# Patient Record
Sex: MALE | Race: WHITE | NOT HISPANIC OR LATINO | Employment: FULL TIME | URBAN - METROPOLITAN AREA
[De-identification: names, ages, dates, MRNs, and addresses within clinical notes are randomized per-mention and may not be internally consistent; named-entity substitution may affect disease eponyms.]

---

## 2017-01-05 ENCOUNTER — ALLSCRIPTS OFFICE VISIT (OUTPATIENT)
Dept: OTHER | Facility: OTHER | Age: 57
End: 2017-01-05

## 2017-01-24 ENCOUNTER — ALLSCRIPTS OFFICE VISIT (OUTPATIENT)
Dept: OTHER | Facility: OTHER | Age: 57
End: 2017-01-24

## 2017-01-24 DIAGNOSIS — B18.2 CHRONIC VIRAL HEPATITIS C (HCC): ICD-10-CM

## 2017-01-27 ENCOUNTER — TRANSCRIBE ORDERS (OUTPATIENT)
Dept: LAB | Facility: CLINIC | Age: 57
End: 2017-01-27

## 2017-01-27 ENCOUNTER — APPOINTMENT (OUTPATIENT)
Dept: LAB | Facility: CLINIC | Age: 57
End: 2017-01-27
Payer: COMMERCIAL

## 2017-01-27 DIAGNOSIS — B19.21 HEPATITIS C VIRUS INFECTION WITH HEPATIC COMA, UNSPECIFIED CHRONICITY: ICD-10-CM

## 2017-01-27 DIAGNOSIS — B19.21 HEPATITIS C VIRUS INFECTION WITH HEPATIC COMA, UNSPECIFIED CHRONICITY: Primary | ICD-10-CM

## 2017-01-27 DIAGNOSIS — B18.2 CHRONIC VIRAL HEPATITIS C (HCC): ICD-10-CM

## 2017-01-27 LAB
ALBUMIN SERPL BCP-MCNC: 3.8 G/DL (ref 3.5–5)
ALP SERPL-CCNC: 94 U/L (ref 46–116)
ALT SERPL W P-5'-P-CCNC: 53 U/L (ref 12–78)
ANION GAP SERPL CALCULATED.3IONS-SCNC: 9 MMOL/L (ref 4–13)
AST SERPL W P-5'-P-CCNC: 39 U/L (ref 5–45)
BILIRUB DIRECT SERPL-MCNC: 0.08 MG/DL (ref 0–0.2)
BILIRUB SERPL-MCNC: 0.25 MG/DL (ref 0.2–1)
BUN SERPL-MCNC: 14 MG/DL (ref 5–25)
CALCIUM SERPL-MCNC: 8.9 MG/DL (ref 8.3–10.1)
CHLORIDE SERPL-SCNC: 105 MMOL/L (ref 100–108)
CO2 SERPL-SCNC: 26 MMOL/L (ref 21–32)
CREAT SERPL-MCNC: 0.78 MG/DL (ref 0.6–1.3)
GFR SERPL CREATININE-BSD FRML MDRD: >60 ML/MIN/1.73SQ M
GLUCOSE SERPL-MCNC: 93 MG/DL (ref 65–140)
POTASSIUM SERPL-SCNC: 4.3 MMOL/L (ref 3.5–5.3)
PROT SERPL-MCNC: 8.2 G/DL (ref 6.4–8.2)
SODIUM SERPL-SCNC: 140 MMOL/L (ref 136–145)

## 2017-01-27 PROCEDURE — 36415 COLL VENOUS BLD VENIPUNCTURE: CPT

## 2017-01-27 PROCEDURE — 83010 ASSAY OF HAPTOGLOBIN QUANT: CPT

## 2017-01-27 PROCEDURE — 83883 ASSAY NEPHELOMETRY NOT SPEC: CPT

## 2017-01-27 PROCEDURE — 82977 ASSAY OF GGT: CPT

## 2017-01-27 PROCEDURE — 82247 BILIRUBIN TOTAL: CPT

## 2017-01-27 PROCEDURE — 87522 HEPATITIS C REVRS TRNSCRPJ: CPT

## 2017-01-27 PROCEDURE — 82248 BILIRUBIN DIRECT: CPT

## 2017-01-27 PROCEDURE — 80053 COMPREHEN METABOLIC PANEL: CPT

## 2017-01-27 PROCEDURE — 82172 ASSAY OF APOLIPOPROTEIN: CPT

## 2017-01-27 PROCEDURE — 87902 NFCT AGT GNTYP ALYS HEP C: CPT

## 2017-01-27 PROCEDURE — 84460 ALANINE AMINO (ALT) (SGPT): CPT

## 2017-01-29 LAB
HCV GENTYP SERPL NAA+PROBE: NORMAL
HCV PLEASE NOTE: NORMAL

## 2017-01-31 LAB
A2 MACROGLOB SERPL-MCNC: 350 MG/DL (ref 110–276)
ALT SERPL W P-5'-P-CCNC: 49 IU/L (ref 0–55)
APO A-I SERPL-MCNC: 176 MG/DL (ref 101–178)
BILIRUB SERPL-MCNC: 0.1 MG/DL (ref 0–1.2)
COMMENT: ABNORMAL
FIBROSIS SCORING:: ABNORMAL
FIBROSIS STAGE SERPL QL: ABNORMAL
GGT SERPL-CCNC: 276 IU/L (ref 0–65)
HAPTOGLOB SERPL-MCNC: 103 MG/DL (ref 34–200)
HCV RNA SERPL NAA+PROBE-ACNC: NORMAL IU/ML
HCV RNA SERPL NAA+PROBE-LOG IU: 6.49 LOG10 IU/ML
INTERPRETATIONS: ABNORMAL
LIVER FIBR SCORE SERPL CALC.FIBROSURE: 0.39 (ref 0–0.21)
NECROINFLAMM ACTIVITY SCORING:: ABNORMAL
NECROINFLAMMATORY ACT GRADE SERPL QL: ABNORMAL
NECROINFLAMMATORY ACT SCORE SERPL: 0.31 (ref 0–0.17)
SERVICE CMNT-IMP: ABNORMAL
TEST INFORMATION: NORMAL

## 2017-02-03 ENCOUNTER — APPOINTMENT (OUTPATIENT)
Dept: LAB | Facility: CLINIC | Age: 57
End: 2017-02-03
Payer: COMMERCIAL

## 2017-02-03 DIAGNOSIS — B18.2 CHRONIC VIRAL HEPATITIS C (HCC): ICD-10-CM

## 2017-02-03 PROCEDURE — 36415 COLL VENOUS BLD VENIPUNCTURE: CPT

## 2017-02-27 ENCOUNTER — GENERIC CONVERSION - ENCOUNTER (OUTPATIENT)
Dept: OTHER | Facility: OTHER | Age: 57
End: 2017-02-27

## 2017-11-08 ENCOUNTER — APPOINTMENT (OUTPATIENT)
Dept: LAB | Facility: CLINIC | Age: 57
End: 2017-11-08
Payer: COMMERCIAL

## 2017-11-08 ENCOUNTER — TRANSCRIBE ORDERS (OUTPATIENT)
Dept: MAMMOGRAPHY | Facility: CLINIC | Age: 57
End: 2017-11-08

## 2017-11-08 DIAGNOSIS — I10 ESSENTIAL HYPERTENSION, MALIGNANT: Primary | ICD-10-CM

## 2017-11-08 LAB
ANION GAP SERPL CALCULATED.3IONS-SCNC: 9 MMOL/L (ref 4–13)
BUN SERPL-MCNC: 21 MG/DL (ref 5–25)
CALCIUM SERPL-MCNC: 8.9 MG/DL (ref 8.3–10.1)
CHLORIDE SERPL-SCNC: 105 MMOL/L (ref 100–108)
CO2 SERPL-SCNC: 24 MMOL/L (ref 21–32)
CREAT SERPL-MCNC: 0.83 MG/DL (ref 0.6–1.3)
GFR SERPL CREATININE-BSD FRML MDRD: 98 ML/MIN/1.73SQ M
GLUCOSE SERPL-MCNC: 98 MG/DL (ref 65–140)
POTASSIUM SERPL-SCNC: 4.5 MMOL/L (ref 3.5–5.3)
SODIUM SERPL-SCNC: 138 MMOL/L (ref 136–145)

## 2017-11-08 PROCEDURE — 80048 BASIC METABOLIC PNL TOTAL CA: CPT

## 2017-11-08 PROCEDURE — 36415 COLL VENOUS BLD VENIPUNCTURE: CPT

## 2018-01-09 NOTE — RESULT NOTES
Verified Results  (1) COMPREHENSIVE METABOLIC PANEL [id number] 18:27DP [facility]     Test Name Result Flag Reference   GLUCOSE 101 mg/dL H 65-99   Fasting reference interval   UREA NITROGEN (BUN) 18 mg/dL  7-25   CREATININE 0 96 mg/dL  0 70-1 33   For patients >52years of age, the reference limit  for Creatinine is approximately 13% higher for people  identified as -American  eGFR NON-AFR  AMERICAN 88 mL/min/1 73m2  > OR = 60   eGFR AFRICAN AMERICAN 102 mL/min/1 73m2  > OR = 60   BUN/CREATININE RATIO   9-98   NOT APPLICABLE (calc)   SODIUM 139 mmol/L  135-146   POTASSIUM 4 1 mmol/L  3 5-5 3   CHLORIDE 104 mmol/L     CARBON DIOXIDE 26 mmol/L  20-31   CALCIUM 9 1 mg/dL  8 6-10 3   PROTEIN, TOTAL 7 6 g/dL  6 1-8 1   ALBUMIN 4 2 g/dL  3 6-5 1   GLOBULIN 3 4 g/dL (calc)  1 9-3 7   ALBUMIN/GLOBULIN RATIO 1 2 (calc)  1 0-2 5   BILIRUBIN, TOTAL 0 3 mg/dL  0 2-1 2   ALKALINE PHOSPHATASE 88 U/L     AST 48 U/L H 10-35   ALT 47 U/L H 9-46     (1) CBC/PLT/DIFF [id number] 01:33PM [facility]     Test Name Result Flag Reference   WHITE BLOOD CELL COUNT 6 8 Thousand/uL  3 8-10 8   RED BLOOD CELL COUNT 4 76 Million/uL  4 20-5 80   HEMOGLOBIN 15 9 g/dL  13 2-17 1   HEMATOCRIT 48 1 %  38 5-50 0    1 fL H 80 0-100 0   MCH 33 4 pg H 27 0-33 0   MCHC 33 0 g/dL  32 0-36 0   RDW 12 0 %  11 0-15 0   PLATELET COUNT 250 Thousand/uL  140-400   MPV 8 3 fL  7 5-11 5   ABSOLUTE NEUTROPHILS 4141 cells/uL  7662-1079   ABSOLUTE LYMPHOCYTES 1816 cells/uL  850-3900   ABSOLUTE MONOCYTES 768 cells/uL  200-950   ABSOLUTE EOSINOPHILS 54 cells/uL     ABSOLUTE BASOPHILS 20 cells/uL  0-200   NEUTROPHILS 60 9 %     LYMPHOCYTES 26 7 %     MONOCYTES 11 3 %     EOSINOPHILS 0 8 %     BASOPHILS 0 3 %       (1) PSA (SCREEN) (Dx V76 44 Screen for Prostate Cancer) [id number] 01:33PM [facility]   REPORT COMMENT:  FASTING:NO  PATIENT NOT FASTING; ADVISED TO RETURN FOR COLLECTION       Test Name Result Flag Reference PSA, TOTAL 0 5 ng/mL  < OR = 4 0   This test was performed using the Siemens  chemiluminescent method  Values obtained from  different assay methods cannot be used  interchangeably  PSA levels, regardless of  value, should not be interpreted as absolute  evidence of the presence or absence of disease         Discussion/Summary   labs are showing slight elevated liver enzymes watch alcohol consumption

## 2018-01-14 VITALS
HEIGHT: 66 IN | WEIGHT: 162.8 LBS | SYSTOLIC BLOOD PRESSURE: 130 MMHG | DIASTOLIC BLOOD PRESSURE: 80 MMHG | HEART RATE: 76 BPM | BODY MASS INDEX: 26.16 KG/M2

## 2018-01-14 VITALS
HEIGHT: 66 IN | WEIGHT: 164.2 LBS | BODY MASS INDEX: 26.39 KG/M2 | TEMPERATURE: 99 F | OXYGEN SATURATION: 96 % | SYSTOLIC BLOOD PRESSURE: 148 MMHG | DIASTOLIC BLOOD PRESSURE: 88 MMHG | HEART RATE: 86 BPM

## 2018-01-17 NOTE — PROGRESS NOTES
Assessment    1  Acute sinusitis (461 9) (J01 90)    Plan  Acute sinusitis    · Azithromycin 500 MG Oral Tablet; TAKE 1 TABLET DAILY UNTIL FINISHED   · Benzonatate 100 MG Oral Capsule (Tessalon Perles); TAKE 1 CAPSULE EVERY 8  HOURS AS NEEDED  Health Maintenance    · COLONOSCOPY; Status:Temporary Deferral - Pt requests deferral;    · Stop: Fluzone Quadrivalent 0 5 ML Intramuscular Suspension    Discussion/Summary    Acute Sinusitis- trial of azithromycin and tessalon perles  Pt advised to call in 1week with an update  Possible side effects of new medications were reviewed with the patient/guardian today  The treatment plan was reviewed with the patient/guardian  The patient/guardian understands and agrees with the treatment plan      Chief Complaint  Patient is here for a cough  History of Present Illness  Pt is here for fever and cough for the past 5 days  Cough is productive of a yellow phleghm  Pt is not a smoker  No allergies, did not receive flu vaccine  Pt took some dayquil today at 6:30am  Pt also has drainage from nose as well  Review of Systems    Constitutional: No fever or chills, feels well, no tiredness, no recent weight gain or weight loss  ENT: as noted in HPI  Cardiovascular: No complaints of slow heart rate, no fast heart rate, no chest pain, no palpitations, no leg claudication, no lower extremity  Respiratory: cough, but as noted in HPI  Gastrointestinal: No complaints of abdominal pain, no constipation, no nausea or vomiting, no diarrhea or bloody stools  Musculoskeletal: No complaints of arthralgia, no myalgias, no joint swelling or stiffness, no limb pain or swelling  Integumentary: No complaints of skin rash or skin lesions, no itching, no skin wound, no dry skin  ROS reviewed  Active Problems    1  Alcohol use (V49 89) (F10 99)   2  Benign essential hypertension (401 1) (I10)   3  Colon cancer screening (V76 51) (Z12 11)   4   Erectile dysfunction of non-organic origin (302 72) (F52 21)   5  Vasovagal syncope (780 2) (R55)    Past Medical History    1  History of Chronic hepatitis C virus infection (070 54) (B18 2)   2  History of Coronary Ostial Stenosis (414 01)   3  History of acute bronchitis (V12 69) (Z87 09)   4  History of hypertension (V12 59) (Z86 79)    The active problems and past medical history were reviewed and updated today  Surgical History    1  History of Chest Tube Insertion   2  History of Hand Surgery    Family History    1  Family history of arthritis (V17 7) (Z82 61)   2  Family history of multiple sclerosis (V17 2) (Z82 0)    3  Family history of diabetes mellitus (V18 0) (Z83 3)    Social History    · Alcohol use (V49 89) (F10 99)   · Never a smoker    Current Meds   1  Cialis 5 MG Oral Tablet; Take 1 tablet daily; Therapy: [id number] to (Evaluate:[id number])  Requested for: 26Jan2016; Last   Rx:26Jan2016 Ordered   2  Lisinopril-Hydrochlorothiazide 10-12 5 MG Oral Tablet; take 1 tablet by mouth every day; Therapy: 38NNR8594 to (Evaluate:[id number])  Requested for: 39PIL7129; Last   Rx:20Oct2015 Ordered    Allergies    1  Claritin-D 24 Hour TB24    Vitals  Vital Signs [Data Includes: Current Encounter]    Recorded: 30WDA5135 10:33AM   Temperature 99 4 F   Heart Rate 82   Systolic 252   Diastolic 84   Height 5 ft 6 in   Weight 159 lb 4 00 oz   BMI Calculated 25 7   BSA Calculated 1 82   O2 Saturation 97     Physical Exam    Constitutional   General appearance: No acute distress, well appearing and well nourished  Eyes   Conjunctiva and lids: No swelling, erythema, or discharge  Ears, Nose, Mouth, and Throat   Nasal mucosa, septum, and turbinates: Normal without edema or erythema  Oropharynx: Normal with no erythema, edema, exudate or lesions  Pulmonary   Respiratory effort: No increased work of breathing or signs of respiratory distress      Auscultation of lungs: Clear to auscultation, equal breath sounds bilaterally, no wheezes, no rales, no rhonci  Cardiovascular   Auscultation of heart: Normal rate and rhythm, normal S1 and S2, without murmurs  Musculoskeletal   Gait and station: Normal     Skin   Skin and subcutaneous tissue: Normal without rashes or lesions           Signatures   Electronically signed by : Devi Johnson MD; Feb 1 2016 11:06AM EST                       (Author)

## 2018-01-30 ENCOUNTER — OFFICE VISIT (OUTPATIENT)
Dept: FAMILY MEDICINE CLINIC | Facility: CLINIC | Age: 58
End: 2018-01-30
Payer: COMMERCIAL

## 2018-01-30 VITALS
OXYGEN SATURATION: 98 % | TEMPERATURE: 98.5 F | SYSTOLIC BLOOD PRESSURE: 152 MMHG | WEIGHT: 167 LBS | BODY MASS INDEX: 26.95 KG/M2 | HEART RATE: 83 BPM | DIASTOLIC BLOOD PRESSURE: 98 MMHG | RESPIRATION RATE: 18 BRPM

## 2018-01-30 DIAGNOSIS — F52.21 ERECTILE DYSFUNCTION OF NON-ORGANIC ORIGIN: ICD-10-CM

## 2018-01-30 DIAGNOSIS — I10 BENIGN ESSENTIAL HYPERTENSION: Primary | ICD-10-CM

## 2018-01-30 PROCEDURE — 99214 OFFICE O/P EST MOD 30 MIN: CPT | Performed by: NURSE PRACTITIONER

## 2018-01-30 RX ORDER — TADALAFIL 5 MG
5 TABLET ORAL DAILY
Refills: 3 | COMMUNITY
Start: 2018-01-24 | End: 2018-08-01

## 2018-01-30 RX ORDER — METOPROLOL SUCCINATE 25 MG/1
25 TABLET, EXTENDED RELEASE ORAL DAILY
Qty: 30 TABLET | Refills: 5 | Status: SHIPPED | OUTPATIENT
Start: 2018-01-30 | End: 2018-08-01 | Stop reason: SDUPTHER

## 2018-01-30 RX ORDER — LISINOPRIL 20 MG/1
20 TABLET ORAL DAILY
Refills: 5 | COMMUNITY
Start: 2018-01-14 | End: 2018-05-03

## 2018-01-30 RX ORDER — SILDENAFIL CITRATE 20 MG/1
20 TABLET ORAL DAILY
Qty: 10 TABLET | Refills: 0 | Status: SHIPPED | OUTPATIENT
Start: 2018-01-30 | End: 2018-08-01

## 2018-01-31 NOTE — PROGRESS NOTES
Assessment/Plan:    No problem-specific Assessment & Plan notes found for this encounter  Diagnoses and all orders for this visit:    Benign essential hypertension    Erectile dysfunction of non-organic origin    Other orders  -     lisinopril (ZESTRIL) 20 mg tablet; Take 20 mg by mouth daily  -     CIALIS 5 MG tablet; Take 5 mg by mouth daily          Subjective:      Patient ID: Winnie Dc is a 62 y o  male  Pt has been on BP medication for a few years  Last Fall he was seen by his Cardiologist    Pt medication was increased at that time  He has had elevated readings since that time  He got a BP cuff and he rpeorted his numbers to the Cardiologist, but never received a call back   A few weeks ago, he checked his BP at the grocery store and it was 163/103  He does not exercise but has a physical job  He does limit salt in his diet  He eats lots of fruits and veggies daily  Denies chest pain, SOB, or headache  ED- insurance coverage issues  Needs to change to [employer]        The following portions of the patient's history were reviewed and updated as appropriate:   He  has a past medical history of Coronary ostial stenosis and Hypertension  He  does not have any pertinent problems on file  He  has a past surgical history that includes Chest tube insertion and Hand surgery  His family history includes Arthritis in his mother; Diabetes in his father; Multiple sclerosis in his mother  He  reports that he has never smoked  He does not have any smokeless tobacco history on file  He reports that he drinks alcohol  His drug history is not on file    Current Outpatient Prescriptions   Medication Sig Dispense Refill    CIALIS 5 MG tablet Take 5 mg by mouth daily  3    lisinopril (ZESTRIL) 20 mg tablet Take 20 mg by mouth daily  5    metoprolol succinate (TOPROL-XL) 25 mg 24 hr tablet Take 1 tablet (25 mg total) by mouth daily for 30 days 30 tablet 5    sildenafil (REVATIO) 20 mg tablet Take 1 tablet (20 mg total) by mouth daily for 30 days 10 tablet 0     No current facility-administered medications for this visit  No current outpatient prescriptions on file prior to visit  No current facility-administered medications on file prior to visit  He is allergic to claritin-d 24 hour  [loratadine-pseudoephedrine er]       Review of Systems   Constitutional: Negative  HENT: Negative  Respiratory: Negative  Cardiovascular: Negative  Gastrointestinal: Negative  Genitourinary:        + ED   Musculoskeletal: Negative  Skin: Negative  Neurological: Negative  Hematological: Negative  Psychiatric/Behavioral: Negative  Objective:     Physical Exam   Constitutional: He is oriented to person, place, and time  He appears well-developed and well-nourished  HENT:   Head: Normocephalic and atraumatic  Mouth/Throat: Oropharynx is clear and moist    Eyes: Conjunctivae and EOM are normal  Pupils are equal, round, and reactive to light  Neck: Normal range of motion  Cardiovascular: Normal rate, regular rhythm and normal heart sounds  Exam reveals no gallop and no friction rub  No murmur heard  Pulmonary/Chest: Effort normal and breath sounds normal  No respiratory distress  Abdominal: Soft  There is no tenderness  Musculoskeletal: Normal range of motion  Lymphadenopathy:     He has no cervical adenopathy  Neurological: He is alert and oriented to person, place, and time  Skin: Skin is warm and dry  Psychiatric: He has a normal mood and affect  His behavior is normal  Judgment and thought content normal    Nursing note and vitals reviewed

## 2018-01-31 NOTE — PATIENT INSTRUCTIONS
BP goal is less than 140/90, optimum is around 130/80    Chronic Hypertension   WHAT YOU NEED TO KNOW:   Hypertension is high blood pressure (BP)  Your BP is the force of your blood moving against the walls of your arteries  Normal BP is less than 120/80  Prehypertension is between 120/80 and 139/89  Hypertension is 140/90 or higher  Hypertension causes your BP to get so high that your heart has to work much harder than normal  This can damage your heart  Chronic hypertension is a long-term condition that you can control with a healthy lifestyle or medicines  A controlled blood pressure helps protect your organs, such as your heart, lungs, brain, and kidneys  DISCHARGE INSTRUCTIONS:   Call 911 for any of the following:   · You have discomfort in your chest that feels like squeezing, pressure, fullness, or pain  · You become confused or have difficulty speaking  · You suddenly feel lightheaded or have trouble breathing  · You have pain or discomfort in your back, neck, jaw, stomach, or arm  Return to the emergency department if:   · You have a severe headache or vision loss  · You have weakness in an arm or leg  Contact your healthcare provider if:   · You feel faint, dizzy, confused, or drowsy  · You have been taking your BP medicine and your BP is still higher than your healthcare provider says it should be  · You have questions or concerns about your condition or care  Medicines: You may need any of the following:  · Medicine  may be used to help lower your BP  You may need more than one type of medicine  Take the medicine exactly as directed  · Diuretics  help decrease extra fluid that collects in your body  This will help lower your BP  You may urinate more often while you take this medicine  · Cholesterol medicine  helps lower your cholesterol level  A low cholesterol level helps prevent heart disease and makes it easier to control your blood pressure       · Take your medicine as directed  Contact your healthcare provider if you think your medicine is not helping or if you have side effects  Tell him or her if you are allergic to any medicine  Keep a list of the medicines, vitamins, and herbs you take  Include the amounts, and when and why you take them  Bring the list or the pill bottles to follow-up visits  Carry your medicine list with you in case of an emergency  Follow up with your healthcare provider as directed: You will need to return to have your blood pressure checked and to have other lab tests done  Write down your questions so you remember to ask them during your visits  Manage chronic hypertension:  Talk with your healthcare provider about these and other ways to manage hypertension:  · Take your BP at home  Sit and rest for 5 minutes before you take your BP  Extend your arm and support it on a flat surface  Your arm should be at the same level as your heart  Follow the directions that came with your BP monitor  If possible, take at least 2 BP readings each time  Take your BP at least twice a day at the same times each day, such as morning and evening  Keep a record of your BP readings and bring it to your follow-up visits  Ask your healthcare provider what your blood pressure should be  · Limit sodium (salt) as directed  Too much sodium can affect your fluid balance  Check labels to find low-sodium or no-salt-added foods  Some low-sodium foods use potassium salts for flavor  Too much potassium can also cause health problems  Your healthcare provider will tell you how much sodium and potassium are safe for you to have in a day  He or she may recommend that you limit sodium to 2,300 mg a day  · Follow the meal plan recommended by your healthcare provider  A dietitian or your provider can give you more information on low-sodium plans or the DASH (Dietary Approaches to Stop Hypertension) eating plan   The DASH plan is low in sodium, unhealthy fats, and total fat  It is high in potassium, calcium, and fiber  · Exercise to maintain a healthy weight  Exercise at least 30 minutes per day, on most days of the week  This will help decrease your blood pressure  Ask about the best exercise plan for you  · Decrease stress  This may help lower your BP  Learn ways to relax, such as deep breathing or listening to music  · Limit alcohol  Women should limit alcohol to 1 drink a day  Men should limit alcohol to 2 drinks a day  A drink of alcohol is 12 ounces of beer, 5 ounces of wine, or 1½ ounces of liquor  · Do not smoke  Nicotine and other chemicals in cigarettes and cigars can increase your BP and also cause lung damage  Ask your healthcare provider for information if you currently smoke and need help to quit  E-cigarettes or smokeless tobacco still contain nicotine  Talk to your healthcare provider before you use these products  © 2017 [employer]  Information is for End User's use only and may not be sold, redistributed or otherwise used for commercial purposes  All illustrations and images included in CareNotes® are the copyrighted property of [employer]  or [employer]  The above information is an  only  It is not intended as medical advice for individual conditions or treatments  Talk to your doctor, nurse or pharmacist before following any medical regimen to see if it is safe and effective for you  Metoprolol (By mouth)   Metoprolol (met-oh-PROE-lol)  Treats high blood pressure, angina (chest pain), and heart failure  May lower the risk of death after a heart attack  This medicine is a beta-blocker  Brand Name(s): Lopressor, Toprol XL   There may be other brand names for this medicine  When This Medicine Should Not Be Used: This medicine is not right for everyone  Do not use if you had an allergic reaction to metoprolol or similar medicines   Do not use this medicine if you have certain blood circulation or heart problems  Ask your doctor about these problems  How to Use This Medicine:   Tablet, Long Acting Tablet  · Take your medicine as directed  Your dose may need to be changed several times to find what works best for you  · Take this medicine with a meal or right after a meal  Take this medicine the same way every day, at the same time  · Swallow the tablet whole with a glass of water  You may break the extended-release tablet in half, but do not chew or crush it  · Missed dose: Take a dose as soon as you remember  If it is almost time for your next dose, wait until then and take a regular dose  Do not take extra medicine to make up for a missed dose  · Store the medicine in a closed container at room temperature, away from heat, moisture, and direct light  Drugs and Foods to Avoid:   Ask your doctor or pharmacist before using any other medicine, including over-the-counter medicines, vitamins, and herbal products  · Some medicines can affect how metoprolol works   Tell your doctor if you are taking any of the following:   ¨ Digoxin, dipyridamole, hydralazine, hydroxychloroquine, methyldopa, quinidine  ¨ Medicine to treat depression (such as bupropion, clomipramine, desipramine, fluoxetine, fluvoxamine, paroxetine, sertraline), medicine to treat mental illness (such as chlorpromazine, fluphenazine, haloperidol, thioridazine), medicine for heart rhythm problems (such as propafenone), HIV/AIDS medicine (such as ritonavir), medicine to treat a fungus infection (such as terbinafine), a monoamine oxidase inhibitor (MAOI), an ergot medicine for headaches, a calcium channel blocker (such as amlodipine, diltiazem, verapamil), or an alpha blocker (such as clonidine, prazosin, reserpine, guanethidine)  Warnings While Using This Medicine:   · Tell your doctor if you are pregnant or breastfeeding, or if you have blood vessel, heart, or circulation problems (such as heart failure, rhythm problems, or slow heartbeat)  Tell your doctor if you have kidney disease, liver disease, diabetes, lung disease (such as asthma), an overactive thyroid, or a history of allergies  · This medicine may cause worse symptoms of heart failure while the dose is being adjusted  · Do not stop using this medicine suddenly  Your doctor will need to slowly decrease your dose before you stop it completely  · Tell any doctor or dentist who treats you that you are using this medicine  You may need to stop using this medicine several days before you have surgery or medical tests  · This medicine could lower your blood pressure too much, especially when you first use it or if you are dehydrated  Stand or sit up slowly if you feel lightheaded or dizzy  · This medicine may make you dizzy or drowsy  Do not drive or do anything else that could be dangerous until you know how this medicine affects you  · Your doctor will check your progress and the effects of this medicine at regular visits  Keep all appointments  · Keep all medicine out of the reach of children  Never share your medicine with anyone  Possible Side Effects While Using This Medicine:   Call your doctor right away if you notice any of these side effects:  · Allergic reaction: Itching or hives, swelling in your face or hands, swelling or tingling in your mouth or throat, chest tightness, trouble breathing  · Lightheadedness, dizziness, or fainting  · Slow heartbeat  · Swelling in your hands, ankles, or feet, trouble breathing, tiredness  · Worsening chest pain  If you notice these less serious side effects, talk with your doctor:   · Diarrhea  · Mild dizziness or tiredness  If you notice other side effects that you think are caused by this medicine, tell your doctor  Call your doctor for medical advice about side effects   You may report side effects to FDA at 5-408-OED-4442  © 2017 [facility] Information is for End User's use only and may not be sold, redistributed or otherwise used for commercial purposes  The above information is an  only  It is not intended as medical advice for individual conditions or treatments  Talk to your doctor, nurse or pharmacist before following any medical regimen to see if it is safe and effective for you

## 2018-04-30 ENCOUNTER — TRANSCRIBE ORDERS (OUTPATIENT)
Dept: LAB | Facility: CLINIC | Age: 58
End: 2018-04-30

## 2018-04-30 ENCOUNTER — APPOINTMENT (OUTPATIENT)
Dept: LAB | Facility: CLINIC | Age: 58
End: 2018-04-30
Payer: COMMERCIAL

## 2018-04-30 DIAGNOSIS — I10 ESSENTIAL HYPERTENSION, MALIGNANT: ICD-10-CM

## 2018-04-30 DIAGNOSIS — I10 ESSENTIAL HYPERTENSION, MALIGNANT: Primary | ICD-10-CM

## 2018-04-30 LAB
ALBUMIN SERPL BCP-MCNC: 4.1 G/DL (ref 3.5–5)
ALP SERPL-CCNC: 99 U/L (ref 46–116)
ALT SERPL W P-5'-P-CCNC: 43 U/L (ref 12–78)
ANION GAP SERPL CALCULATED.3IONS-SCNC: 7 MMOL/L (ref 4–13)
AST SERPL W P-5'-P-CCNC: 44 U/L (ref 5–45)
BILIRUB SERPL-MCNC: 0.5 MG/DL (ref 0.2–1)
BUN SERPL-MCNC: 17 MG/DL (ref 5–25)
CALCIUM SERPL-MCNC: 9.4 MG/DL (ref 8.3–10.1)
CHLORIDE SERPL-SCNC: 99 MMOL/L (ref 100–108)
CHOLEST SERPL-MCNC: 217 MG/DL (ref 50–200)
CO2 SERPL-SCNC: 27 MMOL/L (ref 21–32)
CREAT SERPL-MCNC: 1.01 MG/DL (ref 0.6–1.3)
GFR SERPL CREATININE-BSD FRML MDRD: 82 ML/MIN/1.73SQ M
GLUCOSE P FAST SERPL-MCNC: 98 MG/DL (ref 65–99)
HDLC SERPL-MCNC: 64 MG/DL (ref 40–60)
LDLC SERPL CALC-MCNC: 128 MG/DL (ref 0–100)
NONHDLC SERPL-MCNC: 153 MG/DL
POTASSIUM SERPL-SCNC: 4.5 MMOL/L (ref 3.5–5.3)
PROT SERPL-MCNC: 8.7 G/DL (ref 6.4–8.2)
SODIUM SERPL-SCNC: 133 MMOL/L (ref 136–145)
TRIGL SERPL-MCNC: 127 MG/DL

## 2018-04-30 PROCEDURE — 80053 COMPREHEN METABOLIC PANEL: CPT

## 2018-04-30 PROCEDURE — 80061 LIPID PANEL: CPT

## 2018-04-30 PROCEDURE — 36415 COLL VENOUS BLD VENIPUNCTURE: CPT

## 2018-05-03 ENCOUNTER — OFFICE VISIT (OUTPATIENT)
Dept: FAMILY MEDICINE CLINIC | Facility: CLINIC | Age: 58
End: 2018-05-03
Payer: COMMERCIAL

## 2018-05-03 VITALS
RESPIRATION RATE: 18 BRPM | SYSTOLIC BLOOD PRESSURE: 158 MMHG | DIASTOLIC BLOOD PRESSURE: 88 MMHG | WEIGHT: 170 LBS | BODY MASS INDEX: 27.44 KG/M2 | OXYGEN SATURATION: 98 % | HEART RATE: 84 BPM

## 2018-05-03 DIAGNOSIS — I10 BENIGN ESSENTIAL HYPERTENSION: Primary | ICD-10-CM

## 2018-05-03 DIAGNOSIS — E78.2 HYPERLIPIDEMIA, MIXED: ICD-10-CM

## 2018-05-03 DIAGNOSIS — E87.1 HYPONATREMIA: ICD-10-CM

## 2018-05-03 DIAGNOSIS — N52.9 ERECTILE DYSFUNCTION, UNSPECIFIED ERECTILE DYSFUNCTION TYPE: ICD-10-CM

## 2018-05-03 PROCEDURE — 99214 OFFICE O/P EST MOD 30 MIN: CPT | Performed by: NURSE PRACTITIONER

## 2018-05-03 RX ORDER — VARDENAFIL HYDROCHLORIDE 20 MG/1
20 TABLET ORAL DAILY PRN
Qty: 10 TABLET | Refills: 0 | Status: SHIPPED | OUTPATIENT
Start: 2018-05-03 | End: 2018-08-01 | Stop reason: SDUPTHER

## 2018-05-03 RX ORDER — LISINOPRIL 40 MG/1
40 TABLET ORAL DAILY
Qty: 90 TABLET | Refills: 1 | Status: SHIPPED | OUTPATIENT
Start: 2018-05-03 | End: 2018-08-01

## 2018-05-03 NOTE — PATIENT INSTRUCTIONS
Low sodium- cut back on water consumption  HTN-medication adjusted  HLD- new finding of high cholesterol  Not diet related

## 2018-05-03 NOTE — PROGRESS NOTES
Assessment/Plan:    No problem-specific Assessment & Plan notes found for this encounter  Diagnoses and all orders for this visit:    Benign essential hypertension  -     Comprehensive metabolic panel; Future  -     Lipid panel; Future  -     lisinopril (ZESTRIL) 40 mg tablet; Take 1 tablet (40 mg total) by mouth daily for 90 days  -     Discontinue: Blood Pressure Monitoring (BLOOD PRESSURE MONITOR/M CUFF) MISC; by Does not apply route 2 (two) times a day for 30 days  -     Blood Pressure Monitoring (BLOOD PRESSURE MONITOR/M CUFF) MISC; by Does not apply route 2 (two) times a day for 30 days    Hyperlipidemia, mixed  -     Comprehensive metabolic panel; Future  -     Lipid panel; Future    Hyponatremia  -     Comprehensive metabolic panel; Future  -     Lipid panel; Future    Erectile dysfunction, unspecified erectile dysfunction type  -     vardenafil (LEVITRA) 20 MG tablet; Take 1 tablet (20 mg total) by mouth daily as needed for erectile dysfunction for up to 30 days          Subjective:      Patient ID: Abelchayito Brenner is a 62 y o  male     htn- borderline  Home readings are running high in 170's  Hyponatremia- new finding  Pt drinks 8-10 bottles of water a day  HLD-   Caridad Render  Pt eats no dairy, no cookies, pies or bread  He eats a very large amount of fruits and veggies  ED- pt had imprvmnt with Levitra  Viagra did not help with symptoms        The following portions of the patient's history were reviewed and updated as appropriate:   He  has a past medical history of Coronary ostial stenosis and Hypertension  He   Patient Active Problem List    Diagnosis Date Noted    Hyperlipidemia, mixed 05/03/2018    Hyponatremia 05/03/2018    Benign essential hypertension 06/23/2014    Chronic hepatitis C virus infection ([facility] [address] ) 03/19/2013    Erectile dysfunction of non-organic origin 08/31/2012     He  has a past surgical history that includes Chest tube insertion and Hand surgery    His family history includes Arthritis in his mother; Diabetes in his father; Multiple sclerosis in his mother  He  reports that he has never smoked  He has never used smokeless tobacco  He reports that he drinks alcohol  He reports that he does not use drugs  Current Outpatient Prescriptions   Medication Sig Dispense Refill    Blood Pressure Monitoring (BLOOD PRESSURE MONITOR/M CUFF) MISC by Does not apply route 2 (two) times a day for 30 days 1 each 0    CIALIS 5 MG tablet Take 5 mg by mouth daily  3    lisinopril (ZESTRIL) 40 mg tablet Take 1 tablet (40 mg total) by mouth daily for 90 days 90 tablet 1    metoprolol succinate (TOPROL-XL) 25 mg 24 hr tablet Take 1 tablet (25 mg total) by mouth daily for 30 days 30 tablet 5    sildenafil (REVATIO) 20 mg tablet Take 1 tablet (20 mg total) by mouth daily for 30 days 10 tablet 0    vardenafil (LEVITRA) 20 MG tablet Take 1 tablet (20 mg total) by mouth daily as needed for erectile dysfunction for up to 30 days 10 tablet 0     No current facility-administered medications for this visit  Current Outpatient Prescriptions on File Prior to Visit   Medication Sig    CIALIS 5 MG tablet Take 5 mg by mouth daily    metoprolol succinate (TOPROL-XL) 25 mg 24 hr tablet Take 1 tablet (25 mg total) by mouth daily for 30 days    sildenafil (REVATIO) 20 mg tablet Take 1 tablet (20 mg total) by mouth daily for 30 days    [DISCONTINUED] lisinopril (ZESTRIL) 20 mg tablet Take 20 mg by mouth daily     No current facility-administered medications on file prior to visit  He is allergic to claritin-d 24 hour  [loratadine-pseudoephedrine er]       Review of Systems   Constitutional: Negative for fatigue and fever  HENT: Negative for congestion  Eyes: Negative for visual disturbance  Respiratory: Negative for cough and shortness of breath  Cardiovascular: Negative for chest pain, palpitations and leg swelling  Gastrointestinal: Negative for abdominal distention and abdominal pain  Endocrine: Negative for cold intolerance, polydipsia and polyuria  Genitourinary: Negative for difficulty urinating  Musculoskeletal: Negative for back pain and joint swelling  Skin: Negative for color change and rash  Allergic/Immunologic: Negative for immunocompromised state  Neurological: Negative for dizziness and headaches  Hematological: Negative for adenopathy  Psychiatric/Behavioral: Negative for behavioral problems and sleep disturbance  All other systems reviewed and are negative  Objective:      /88 (BP Location: Right arm, Patient Position: Sitting)   Pulse 84   Resp 18   Wt 77 1 kg (170 lb)   SpO2 98%   BMI 27 44 kg/m²          Physical Exam   Constitutional: He is oriented to person, place, and time  He appears well-developed and well-nourished  No distress  HENT:   Head: Normocephalic and atraumatic  Mouth/Throat: No oropharyngeal exudate  Eyes: Conjunctivae and EOM are normal  Pupils are equal, round, and reactive to light  Right eye exhibits no discharge  Left eye exhibits no discharge  Neck: Normal range of motion  Neck supple  Cardiovascular: Normal rate, regular rhythm and normal heart sounds  Exam reveals no gallop and no friction rub  No murmur heard  Pulmonary/Chest: Effort normal and breath sounds normal  No respiratory distress  He has no wheezes  Abdominal: Soft  Musculoskeletal: Normal range of motion  He exhibits no edema  Lymphadenopathy:     He has no cervical adenopathy  Neurological: He is alert and oriented to person, place, and time  Skin: Skin is warm and dry  No rash noted  He is not diaphoretic  No erythema  Psychiatric: He has a normal mood and affect  His behavior is normal  Judgment and thought content normal    Nursing note and vitals reviewed

## 2018-08-01 ENCOUNTER — OFFICE VISIT (OUTPATIENT)
Dept: FAMILY MEDICINE CLINIC | Facility: CLINIC | Age: 58
End: 2018-08-01
Payer: COMMERCIAL

## 2018-08-01 VITALS
RESPIRATION RATE: 18 BRPM | HEIGHT: 66 IN | DIASTOLIC BLOOD PRESSURE: 102 MMHG | BODY MASS INDEX: 27.48 KG/M2 | HEART RATE: 83 BPM | OXYGEN SATURATION: 97 % | SYSTOLIC BLOOD PRESSURE: 162 MMHG | WEIGHT: 171 LBS

## 2018-08-01 DIAGNOSIS — N52.9 ERECTILE DYSFUNCTION, UNSPECIFIED ERECTILE DYSFUNCTION TYPE: ICD-10-CM

## 2018-08-01 DIAGNOSIS — I10 BENIGN ESSENTIAL HYPERTENSION: Primary | ICD-10-CM

## 2018-08-01 DIAGNOSIS — F52.21 ERECTILE DYSFUNCTION OF NON-ORGANIC ORIGIN: ICD-10-CM

## 2018-08-01 PROCEDURE — 99214 OFFICE O/P EST MOD 30 MIN: CPT | Performed by: NURSE PRACTITIONER

## 2018-08-01 PROCEDURE — 3008F BODY MASS INDEX DOCD: CPT | Performed by: NURSE PRACTITIONER

## 2018-08-01 RX ORDER — VARDENAFIL HYDROCHLORIDE 20 MG/1
20 TABLET ORAL DAILY PRN
Qty: 10 TABLET | Refills: 1 | Status: SHIPPED | OUTPATIENT
Start: 2018-08-31 | End: 2018-08-01 | Stop reason: SDUPTHER

## 2018-08-01 RX ORDER — OLMESARTAN MEDOXOMIL AND HYDROCHLOROTHIAZIDE 40/12.5 40; 12.5 MG/1; MG/1
1 TABLET ORAL DAILY
Qty: 90 TABLET | Refills: 2 | Status: SHIPPED | OUTPATIENT
Start: 2018-08-01 | End: 2018-10-24 | Stop reason: SDUPTHER

## 2018-08-01 RX ORDER — VARDENAFIL HYDROCHLORIDE 20 MG/1
20 TABLET ORAL DAILY PRN
Qty: 10 TABLET | Refills: 0 | Status: SHIPPED | OUTPATIENT
Start: 2018-08-31 | End: 2018-10-24

## 2018-08-01 RX ORDER — METOPROLOL SUCCINATE 25 MG/1
25 TABLET, EXTENDED RELEASE ORAL DAILY
Qty: 90 TABLET | Refills: 1 | Status: SHIPPED | OUTPATIENT
Start: 2018-08-01 | End: 2018-10-24 | Stop reason: SDUPTHER

## 2018-08-01 NOTE — PROGRESS NOTES
Assessment/Plan:    No problem-specific Assessment & Plan notes found for this encounter  Diagnoses and all orders for this visit:    Benign essential hypertension  -     Comprehensive metabolic panel; Future  -     olmesartan-hydrochlorothiazide (BENICAR HCT) 40-12 5 MG per tablet; Take 1 tablet by mouth daily for 90 days  -     metoprolol succinate (TOPROL-XL) 25 mg 24 hr tablet; Take 1 tablet (25 mg total) by mouth daily for 90 days    Erectile dysfunction of non-organic origin    Erectile dysfunction, unspecified erectile dysfunction type  -     Discontinue: vardenafil (LEVITRA) 20 MG tablet; Take 1 tablet (20 mg total) by mouth daily as needed for erectile dysfunction for up to 30 days  -     vardenafil (LEVITRA) 20 MG tablet; Take 1 tablet (20 mg total) by mouth daily as needed for erectile dysfunction for up to 30 days          Subjective:      Patient ID: Ben Price is a 62 y o  male  Pt is here for follow up  He did not get his labs reviewed  HTN- not controlled  Pt reports home readings are variable  He ran out of his Toprol yesterday  ED-         The following portions of the patient's history were reviewed and updated as appropriate:   He  has a past medical history of Coronary ostial stenosis and Hypertension  He   Patient Active Problem List    Diagnosis Date Noted    Hyperlipidemia, mixed 05/03/2018    Hyponatremia 05/03/2018    Benign essential hypertension 06/23/2014    Chronic hepatitis C virus infection ([facility] [address] ) 03/19/2013    Erectile dysfunction of non-organic origin 08/31/2012     He  has a past surgical history that includes Chest tube insertion and Hand surgery  His family history includes Arthritis in his mother; Diabetes in his father; Multiple sclerosis in his mother  He  reports that he has never smoked  He has never used smokeless tobacco  He reports that he drinks alcohol  He reports that he does not use drugs    Current Outpatient Prescriptions   Medication Sig Dispense Refill    metoprolol succinate (TOPROL-XL) 25 mg 24 hr tablet Take 1 tablet (25 mg total) by mouth daily for 90 days 90 tablet 1    olmesartan-hydrochlorothiazide (BENICAR HCT) 40-12 5 MG per tablet Take 1 tablet by mouth daily for 90 days 90 tablet 2    [START ON 8/31/2018] vardenafil (LEVITRA) 20 MG tablet Take 1 tablet (20 mg total) by mouth daily as needed for erectile dysfunction for up to 30 days 10 tablet 0     No current facility-administered medications for this visit  Current Outpatient Prescriptions on File Prior to Visit   Medication Sig    [DISCONTINUED] CIALIS 5 MG tablet Take 5 mg by mouth daily    [DISCONTINUED] lisinopril (ZESTRIL) 40 mg tablet Take 1 tablet (40 mg total) by mouth daily for 90 days    [DISCONTINUED] metoprolol succinate (TOPROL-XL) 25 mg 24 hr tablet Take 1 tablet (25 mg total) by mouth daily for 30 days    [DISCONTINUED] sildenafil (REVATIO) 20 mg tablet Take 1 tablet (20 mg total) by mouth daily for 30 days    [DISCONTINUED] vardenafil (LEVITRA) 20 MG tablet Take 1 tablet (20 mg total) by mouth daily as needed for erectile dysfunction for up to 30 days     No current facility-administered medications on file prior to visit  He is allergic to claritin-d 24 hour  [loratadine-pseudoephedrine er]       Review of Systems   Constitutional: Negative for fatigue and fever  HENT: Negative for congestion  Eyes: Negative for visual disturbance  Respiratory: Negative for cough and shortness of breath  Cardiovascular: Negative for chest pain, palpitations and leg swelling  Gastrointestinal: Negative for abdominal distention and abdominal pain  Endocrine: Negative for cold intolerance, polydipsia and polyuria  Genitourinary: Negative for difficulty urinating  Musculoskeletal: Negative for back pain and joint swelling  Skin: Negative for color change and rash  Allergic/Immunologic: Negative for immunocompromised state     Neurological: Negative for dizziness, light-headedness and headaches  Hematological: Negative for adenopathy  Psychiatric/Behavioral: Negative for behavioral problems and sleep disturbance  All other systems reviewed and are negative  Objective:      BP (!) 162/102 (BP Location: Right arm, Patient Position: Sitting)   Pulse 83   Resp 18   Ht 5' 6" (1 676 m)   Wt 77 6 kg (171 lb)   SpO2 97%   BMI 27 60 kg/m²          Physical Exam   Constitutional: He is oriented to person, place, and time  He appears well-developed and well-nourished  HENT:   Head: Normocephalic and atraumatic  Mouth/Throat: Oropharynx is clear and moist    Eyes: Conjunctivae and EOM are normal  Pupils are equal, round, and reactive to light  Neck: Normal range of motion  Cardiovascular: Normal rate, regular rhythm, normal heart sounds and intact distal pulses  Exam reveals no gallop and no friction rub  No murmur heard  Pulmonary/Chest: Effort normal and breath sounds normal  No respiratory distress  He has no wheezes  Abdominal: Soft  There is no tenderness  Musculoskeletal: Normal range of motion  He exhibits no edema or tenderness  Lymphadenopathy:     He has no cervical adenopathy  Neurological: He is alert and oriented to person, place, and time  Skin: Skin is warm and dry  Psychiatric: He has a normal mood and affect  His behavior is normal  Judgment and thought content normal    Nursing note and vitals reviewed

## 2018-08-01 NOTE — PATIENT INSTRUCTIONS
htn- not controlled  Stop Lisinopril  Start Benicar HCT  Medication changes as noted  Continue to monitor BP and notify office if BP continues to rise with med changes  ED-continue current medication  Return in 3 months

## 2018-08-07 ENCOUNTER — LAB (OUTPATIENT)
Dept: LAB | Facility: CLINIC | Age: 58
End: 2018-08-07
Payer: COMMERCIAL

## 2018-08-07 DIAGNOSIS — I10 BENIGN ESSENTIAL HYPERTENSION: ICD-10-CM

## 2018-08-07 LAB
ALBUMIN SERPL BCP-MCNC: 4 G/DL (ref 3.5–5)
ALP SERPL-CCNC: 90 U/L (ref 46–116)
ALT SERPL W P-5'-P-CCNC: 44 U/L (ref 12–78)
ANION GAP SERPL CALCULATED.3IONS-SCNC: 5 MMOL/L (ref 4–13)
AST SERPL W P-5'-P-CCNC: 33 U/L (ref 5–45)
BILIRUB SERPL-MCNC: 0.35 MG/DL (ref 0.2–1)
BUN SERPL-MCNC: 18 MG/DL (ref 5–25)
CALCIUM SERPL-MCNC: 9.1 MG/DL (ref 8.3–10.1)
CHLORIDE SERPL-SCNC: 104 MMOL/L (ref 100–108)
CO2 SERPL-SCNC: 27 MMOL/L (ref 21–32)
CREAT SERPL-MCNC: 0.85 MG/DL (ref 0.6–1.3)
GFR SERPL CREATININE-BSD FRML MDRD: 97 ML/MIN/1.73SQ M
GLUCOSE P FAST SERPL-MCNC: 89 MG/DL (ref 65–99)
POTASSIUM SERPL-SCNC: 4.6 MMOL/L (ref 3.5–5.3)
PROT SERPL-MCNC: 8.5 G/DL (ref 6.4–8.2)
SODIUM SERPL-SCNC: 136 MMOL/L (ref 136–145)

## 2018-08-07 PROCEDURE — 80053 COMPREHEN METABOLIC PANEL: CPT

## 2018-08-07 PROCEDURE — 36415 COLL VENOUS BLD VENIPUNCTURE: CPT

## 2018-10-24 ENCOUNTER — OFFICE VISIT (OUTPATIENT)
Dept: FAMILY MEDICINE CLINIC | Facility: CLINIC | Age: 58
End: 2018-10-24
Payer: COMMERCIAL

## 2018-10-24 VITALS
RESPIRATION RATE: 18 BRPM | SYSTOLIC BLOOD PRESSURE: 148 MMHG | BODY MASS INDEX: 26.71 KG/M2 | HEIGHT: 66 IN | OXYGEN SATURATION: 97 % | WEIGHT: 166.19 LBS | HEART RATE: 84 BPM | DIASTOLIC BLOOD PRESSURE: 82 MMHG

## 2018-10-24 DIAGNOSIS — B18.2 CHRONIC HEPATITIS C WITHOUT HEPATIC COMA (HCC): ICD-10-CM

## 2018-10-24 DIAGNOSIS — Z12.5 PROSTATE CANCER SCREENING: ICD-10-CM

## 2018-10-24 DIAGNOSIS — E78.2 HYPERLIPIDEMIA, MIXED: ICD-10-CM

## 2018-10-24 DIAGNOSIS — N52.9 ERECTILE DYSFUNCTION, UNSPECIFIED ERECTILE DYSFUNCTION TYPE: ICD-10-CM

## 2018-10-24 DIAGNOSIS — I10 BENIGN ESSENTIAL HYPERTENSION: Primary | ICD-10-CM

## 2018-10-24 DIAGNOSIS — F52.21 ERECTILE DYSFUNCTION OF NON-ORGANIC ORIGIN: ICD-10-CM

## 2018-10-24 PROBLEM — E87.1 HYPONATREMIA: Status: RESOLVED | Noted: 2018-05-03 | Resolved: 2018-10-24

## 2018-10-24 PROCEDURE — 1036F TOBACCO NON-USER: CPT | Performed by: NURSE PRACTITIONER

## 2018-10-24 PROCEDURE — 3008F BODY MASS INDEX DOCD: CPT | Performed by: NURSE PRACTITIONER

## 2018-10-24 PROCEDURE — 99214 OFFICE O/P EST MOD 30 MIN: CPT | Performed by: NURSE PRACTITIONER

## 2018-10-24 RX ORDER — VARDENAFIL HYDROCHLORIDE 20 MG/1
20 TABLET ORAL DAILY PRN
Qty: 10 TABLET | Refills: 0 | Status: SHIPPED | OUTPATIENT
Start: 2018-10-24 | End: 2019-04-24 | Stop reason: SDUPTHER

## 2018-10-24 RX ORDER — TADALAFIL 5 MG/1
5 TABLET ORAL
COMMUNITY
End: 2018-10-24

## 2018-10-24 RX ORDER — OLMESARTAN MEDOXOMIL AND HYDROCHLOROTHIAZIDE 40/12.5 40; 12.5 MG/1; MG/1
1 TABLET ORAL DAILY
Qty: 90 TABLET | Refills: 3 | Status: SHIPPED | OUTPATIENT
Start: 2018-10-24 | End: 2019-04-24 | Stop reason: SDUPTHER

## 2018-10-24 RX ORDER — METOPROLOL SUCCINATE 25 MG/1
25 TABLET, EXTENDED RELEASE ORAL DAILY
Qty: 90 TABLET | Refills: 3 | Status: SHIPPED | OUTPATIENT
Start: 2018-10-24 | End: 2019-04-24 | Stop reason: SDUPTHER

## 2018-10-24 NOTE — PROGRESS NOTES
Assessment/Plan:    No problem-specific Assessment & Plan notes found for this encounter  Diagnoses and all orders for this visit:    Benign essential hypertension  -     Comprehensive metabolic panel; Future  -     Lipid panel; Future  -     olmesartan-hydrochlorothiazide (BENICAR HCT) 40-12 5 MG per tablet; Take 1 tablet by mouth daily for 90 days  -     metoprolol succinate (TOPROL-XL) 25 mg 24 hr tablet; Take 1 tablet (25 mg total) by mouth daily for 90 days    Erectile dysfunction of non-organic origin  -     Comprehensive metabolic panel; Future  -     Lipid panel; Future    Chronic hepatitis C without hepatic coma (HCC)  -     Comprehensive metabolic panel; Future  -     Lipid panel; Future    Hyperlipidemia, mixed  -     Comprehensive metabolic panel; Future  -     Lipid panel; Future    Prostate cancer screening  -     PSA, Total Screen; Future    Erectile dysfunction, unspecified erectile dysfunction type  -     vardenafil (LEVITRA) 20 MG tablet; Take 1 tablet (20 mg total) by mouth daily as needed for erectile dysfunction for up to 30 days    Other orders  -     Discontinue: tadalafil (CIALIS) 5 MG tablet; Take 5 mg by mouth          Subjective:      Patient ID: Janeen Varela is a 62 y o  male  Pt is here for lab review and follow up  Labs were last done 8/7/18 and were all wnl  HTN-stable  He did check his BP at home when off of work and his BP was in the 120's  He thinks work stresses him out  He works independently installing sheet metal and ductwork  Hyperlipidemia-? Status  ED-  hyponatremia- resolved          The following portions of the patient's history were reviewed and updated as appropriate:   He  has a past medical history of Coronary ostial stenosis and Hypertension    He   Patient Active Problem List    Diagnosis Date Noted    Prostate cancer screening 10/24/2018    Hyperlipidemia, mixed 05/03/2018    Benign essential hypertension 06/23/2014    Chronic hepatitis C virus infection ([facility] [address] ) 03/19/2013    Erectile dysfunction of non-organic origin 08/31/2012     He  has a past surgical history that includes Chest tube insertion and Hand surgery  His family history includes Arthritis in his mother; Diabetes in his father; Multiple sclerosis in his mother  He  reports that he has never smoked  He has never used smokeless tobacco  He reports that he drinks alcohol  He reports that he does not use drugs  Current Outpatient Prescriptions   Medication Sig Dispense Refill    metoprolol succinate (TOPROL-XL) 25 mg 24 hr tablet Take 1 tablet (25 mg total) by mouth daily for 90 days 90 tablet 3    olmesartan-hydrochlorothiazide (BENICAR HCT) 40-12 5 MG per tablet Take 1 tablet by mouth daily for 90 days 90 tablet 3    vardenafil (LEVITRA) 20 MG tablet Take 1 tablet (20 mg total) by mouth daily as needed for erectile dysfunction for up to 30 days 10 tablet 0     No current facility-administered medications for this visit  Current Outpatient Prescriptions on File Prior to Visit   Medication Sig    [DISCONTINUED] metoprolol succinate (TOPROL-XL) 25 mg 24 hr tablet Take 1 tablet (25 mg total) by mouth daily for 90 days    [DISCONTINUED] olmesartan-hydrochlorothiazide (BENICAR HCT) 40-12 5 MG per tablet Take 1 tablet by mouth daily for 90 days    [DISCONTINUED] vardenafil (LEVITRA) 20 MG tablet Take 1 tablet (20 mg total) by mouth daily as needed for erectile dysfunction for up to 30 days     No current facility-administered medications on file prior to visit  He is allergic to claritin-d 24 hour  [loratadine-pseudoephedrine er]       Review of Systems   Constitutional: Negative for fatigue and fever  HENT: Negative for congestion  Eyes: Negative for visual disturbance  Respiratory: Negative for cough, chest tightness and shortness of breath  Cardiovascular: Negative for chest pain, palpitations and leg swelling     Gastrointestinal: Negative for abdominal distention and abdominal pain  Endocrine: Negative for cold intolerance, polydipsia and polyuria  Genitourinary: Negative for difficulty urinating         + ED   Musculoskeletal: Negative for back pain and joint swelling  Skin: Negative for color change and rash  Allergic/Immunologic: Negative for immunocompromised state  Neurological: Negative for dizziness and headaches  Hematological: Negative for adenopathy  Psychiatric/Behavioral: Negative for behavioral problems and sleep disturbance  All other systems reviewed and are negative  Objective:      /82 (BP Location: Left arm, Patient Position: Sitting)   Pulse 84   Resp 18   Ht 5' 6" (1 676 m)   Wt 75 4 kg (166 lb 3 oz)   SpO2 97%   BMI 26 82 kg/m²          Physical Exam   Constitutional: He is oriented to person, place, and time  He appears well-developed and well-nourished  No distress  HENT:   Head: Normocephalic and atraumatic  Right Ear: External ear normal    Left Ear: External ear normal    Nose: Nose normal    Mouth/Throat: Oropharynx is clear and moist  No oropharyngeal exudate  Eyes: Pupils are equal, round, and reactive to light  Conjunctivae are normal  Right eye exhibits no discharge  Left eye exhibits no discharge  Neck: Normal range of motion  Neck supple  Cardiovascular: Normal rate, regular rhythm and normal heart sounds  Exam reveals no gallop and no friction rub  No murmur heard  Pulmonary/Chest: Effort normal and breath sounds normal  No respiratory distress  He has no wheezes  Abdominal: Soft  Bowel sounds are normal  He exhibits no distension  Musculoskeletal: Normal range of motion  He exhibits no edema  Resting tremor of right thumb   Lymphadenopathy:     He has no cervical adenopathy  Neurological: He is alert and oriented to person, place, and time  Skin: Skin is warm and dry  No rash noted  He is not diaphoretic  No erythema  Psychiatric: He has a normal mood and affect   His behavior is normal  Judgment and thought content normal    Nursing note and vitals reviewed

## 2018-10-24 NOTE — PATIENT INSTRUCTIONS
Hypertension-encourage low salt diet  Hyperlipidemia-strive for five servings of fruits and veggies daily  Daily exercise encouraged  ED-  Prostate cancer screening- check PSA  Follow up in 4 months

## 2019-04-24 ENCOUNTER — OFFICE VISIT (OUTPATIENT)
Dept: FAMILY MEDICINE CLINIC | Facility: CLINIC | Age: 59
End: 2019-04-24
Payer: COMMERCIAL

## 2019-04-24 VITALS
RESPIRATION RATE: 18 BRPM | WEIGHT: 160 LBS | BODY MASS INDEX: 25.71 KG/M2 | DIASTOLIC BLOOD PRESSURE: 78 MMHG | HEIGHT: 66 IN | SYSTOLIC BLOOD PRESSURE: 118 MMHG | HEART RATE: 80 BPM | OXYGEN SATURATION: 97 %

## 2019-04-24 DIAGNOSIS — E78.2 HYPERLIPIDEMIA, MIXED: ICD-10-CM

## 2019-04-24 DIAGNOSIS — B18.2 CHRONIC HEPATITIS C WITHOUT HEPATIC COMA (HCC): ICD-10-CM

## 2019-04-24 DIAGNOSIS — N52.9 ERECTILE DYSFUNCTION, UNSPECIFIED ERECTILE DYSFUNCTION TYPE: ICD-10-CM

## 2019-04-24 DIAGNOSIS — F52.21 ERECTILE DYSFUNCTION OF NON-ORGANIC ORIGIN: ICD-10-CM

## 2019-04-24 DIAGNOSIS — I10 BENIGN ESSENTIAL HYPERTENSION: Primary | ICD-10-CM

## 2019-04-24 PROCEDURE — 3074F SYST BP LT 130 MM HG: CPT | Performed by: NURSE PRACTITIONER

## 2019-04-24 PROCEDURE — 1036F TOBACCO NON-USER: CPT | Performed by: NURSE PRACTITIONER

## 2019-04-24 PROCEDURE — 3078F DIAST BP <80 MM HG: CPT | Performed by: NURSE PRACTITIONER

## 2019-04-24 PROCEDURE — 3008F BODY MASS INDEX DOCD: CPT | Performed by: NURSE PRACTITIONER

## 2019-04-24 PROCEDURE — 99214 OFFICE O/P EST MOD 30 MIN: CPT | Performed by: NURSE PRACTITIONER

## 2019-04-24 RX ORDER — VARDENAFIL HYDROCHLORIDE 20 MG/1
20 TABLET ORAL DAILY PRN
Qty: 10 TABLET | Refills: 0 | Status: SHIPPED | OUTPATIENT
Start: 2019-04-24 | End: 2020-06-08 | Stop reason: SDUPTHER

## 2019-04-24 RX ORDER — OLMESARTAN MEDOXOMIL AND HYDROCHLOROTHIAZIDE 40/12.5 40; 12.5 MG/1; MG/1
1 TABLET ORAL DAILY
Qty: 90 TABLET | Refills: 3 | Status: SHIPPED | OUTPATIENT
Start: 2019-04-24 | End: 2019-04-25

## 2019-04-24 RX ORDER — METOPROLOL SUCCINATE 25 MG/1
25 TABLET, EXTENDED RELEASE ORAL DAILY
Qty: 90 TABLET | Refills: 3 | Status: SHIPPED | OUTPATIENT
Start: 2019-04-24 | End: 2019-08-09 | Stop reason: SDUPTHER

## 2019-04-25 ENCOUNTER — TELEPHONE (OUTPATIENT)
Dept: FAMILY MEDICINE CLINIC | Facility: CLINIC | Age: 59
End: 2019-04-25

## 2019-04-25 DIAGNOSIS — I10 ESSENTIAL HYPERTENSION: Primary | ICD-10-CM

## 2019-04-25 RX ORDER — HYDROCHLOROTHIAZIDE 12.5 MG/1
12.5 TABLET ORAL DAILY
Qty: 90 TABLET | Refills: 3 | Status: SHIPPED | OUTPATIENT
Start: 2019-04-25 | End: 2019-04-25

## 2019-04-25 RX ORDER — CANDESARTAN CILEXETIL AND HYDROCHLOROTHIAZIDE 16; 12.5 MG/1; MG/1
1 TABLET ORAL DAILY
Qty: 90 TABLET | Refills: 3 | Status: SHIPPED | OUTPATIENT
Start: 2019-04-25 | End: 2019-05-07

## 2019-04-25 RX ORDER — LISINOPRIL 40 MG/1
40 TABLET ORAL DAILY
Qty: 90 TABLET | Refills: 3 | Status: SHIPPED | OUTPATIENT
Start: 2019-04-25 | End: 2019-04-25

## 2019-05-07 ENCOUNTER — TELEPHONE (OUTPATIENT)
Dept: FAMILY MEDICINE CLINIC | Facility: CLINIC | Age: 59
End: 2019-05-07

## 2019-05-07 DIAGNOSIS — I10 BENIGN ESSENTIAL HYPERTENSION: ICD-10-CM

## 2019-05-07 RX ORDER — OLMESARTAN MEDOXOMIL AND HYDROCHLOROTHIAZIDE 40/12.5 40; 12.5 MG/1; MG/1
1 TABLET ORAL DAILY
Qty: 90 TABLET | Refills: 2 | Status: SHIPPED | OUTPATIENT
Start: 2019-05-07 | End: 2019-10-23 | Stop reason: SDUPTHER

## 2019-08-09 DIAGNOSIS — I10 BENIGN ESSENTIAL HYPERTENSION: ICD-10-CM

## 2019-08-10 RX ORDER — METOPROLOL SUCCINATE 25 MG/1
25 TABLET, EXTENDED RELEASE ORAL DAILY
Qty: 90 TABLET | Refills: 3 | Status: SHIPPED | OUTPATIENT
Start: 2019-08-10 | End: 2019-10-23 | Stop reason: SDUPTHER

## 2019-10-22 NOTE — PROGRESS NOTES
Assessment/Plan:       Diagnoses and all orders for this visit:    Benign essential hypertension  -     Comprehensive metabolic panel; Future  -     Lipid panel; Future  -     olmesartan-hydrochlorothiazide (BENICAR HCT) 40-12 5 MG per tablet; Take 1 tablet by mouth daily  -     metoprolol succinate (TOPROL-XL) 25 mg 24 hr tablet; Take 1 tablet (25 mg total) by mouth daily    Hyperlipidemia, mixed  -     Comprehensive metabolic panel; Future  -     Lipid panel; Future    Erectile dysfunction of non-organic origin    Chronic hepatitis C without hepatic coma (HCC)    Prostate cancer screening  -     PSA, Total Screen; Future    BMI 25 0-25 9,adult        No problem-specific Assessment & Plan notes found for this encounter  Subjective:      Patient ID: Dylan Lazo is a 61 y o  male  Patient is here for follow up of chronic medical conditions  He has no current labwork done    He feels well  Hypertension-borderline  HLD-did not get his labs done  ED-needs refill      The following portions of the patient's history were reviewed and updated as appropriate:   He has a past medical history of Coronary ostial stenosis and Hypertension  ,  does not have any pertinent problems on file  ,   has a past surgical history that includes Chest tube insertion and Hand surgery  ,  family history includes Arthritis in his mother; Diabetes in his father; Multiple sclerosis in his mother  ,   reports that he has never smoked  He has never used smokeless tobacco  He reports that he drinks alcohol  He reports that he does not use drugs  ,  is allergic to claritin-d 24 hour  [loratadine-pseudoephedrine er]     Current Outpatient Medications   Medication Sig Dispense Refill    metoprolol succinate (TOPROL-XL) 25 mg 24 hr tablet Take 1 tablet (25 mg total) by mouth daily 90 tablet 3    olmesartan-hydrochlorothiazide (BENICAR HCT) 40-12 5 MG per tablet Take 1 tablet by mouth daily 90 tablet 2    vardenafil (LEVITRA) 20 MG tablet Take 1 tablet (20 mg total) by mouth daily as needed for erectile dysfunction for up to 30 days 10 tablet 0     No current facility-administered medications for this visit  Review of Systems   Constitutional: Negative for fatigue and fever  HENT: Negative for congestion  Eyes: Negative for visual disturbance  Respiratory: Negative for cough and shortness of breath  Cardiovascular: Negative for chest pain, palpitations and leg swelling  Gastrointestinal: Negative for abdominal distention and abdominal pain  Endocrine: Negative for cold intolerance, polydipsia and polyuria  Genitourinary: Negative for difficulty urinating  Musculoskeletal: Negative for back pain and joint swelling  Skin: Negative for color change and rash  Allergic/Immunologic: Negative for immunocompromised state  Neurological: Negative for dizziness and headaches  Hematological: Negative for adenopathy  Psychiatric/Behavioral: Negative for behavioral problems and sleep disturbance  All other systems reviewed and are negative  Objective:  Vitals:    10/23/19 1650   BP: 146/82   BP Location: Left arm   Patient Position: Sitting   Pulse: 82   Resp: 18   SpO2: 98%   Weight: 70 8 kg (156 lb)   Height: 5' 6" (1 676 m)     Body mass index is 25 18 kg/m²  Physical Exam   Constitutional: He is oriented to person, place, and time  He appears well-developed and well-nourished  No distress  HENT:   Head: Normocephalic and atraumatic  Right Ear: External ear normal    Left Ear: External ear normal    Nose: Nose normal    Mouth/Throat: Oropharynx is clear and moist  No oropharyngeal exudate  Eyes: Pupils are equal, round, and reactive to light  Conjunctivae and EOM are normal  Right eye exhibits no discharge  Left eye exhibits no discharge  No scleral icterus  Neck: Normal range of motion  Neck supple  No JVD present  No thyromegaly present     Cardiovascular: Normal rate, regular rhythm, normal heart sounds and intact distal pulses  Exam reveals no gallop and no friction rub  No murmur heard  Pulmonary/Chest: Effort normal and breath sounds normal  No respiratory distress  He exhibits no tenderness  Abdominal: Soft  Bowel sounds are normal  He exhibits no distension  There is no tenderness  Musculoskeletal: Normal range of motion  He exhibits no edema or tenderness  Lymphadenopathy:     He has no cervical adenopathy  Neurological: He is alert and oriented to person, place, and time  He has normal reflexes  No cranial nerve deficit  Coordination normal    Skin: Skin is warm and dry  Capillary refill takes less than 2 seconds  He is not diaphoretic  Psychiatric: He has a normal mood and affect  His behavior is normal  Judgment and thought content normal    Nursing note and vitals reviewed  BMI Counseling: Body mass index is 25 18 kg/m²  The BMI is above normal  Nutrition recommendations include reducing portion sizes, decreasing overall calorie intake, 3-5 servings of fruits/vegetables daily, reducing fast food intake, consuming healthier snacks, decreasing soda and/or juice intake, moderation in carbohydrate intake, increasing intake of lean protein, reducing intake of saturated fat and trans fat and reducing intake of cholesterol  Exercise recommendations include strength training exercises

## 2019-10-23 ENCOUNTER — OFFICE VISIT (OUTPATIENT)
Dept: FAMILY MEDICINE CLINIC | Facility: CLINIC | Age: 59
End: 2019-10-23
Payer: COMMERCIAL

## 2019-10-23 VITALS
HEART RATE: 82 BPM | SYSTOLIC BLOOD PRESSURE: 146 MMHG | HEIGHT: 66 IN | OXYGEN SATURATION: 98 % | BODY MASS INDEX: 25.07 KG/M2 | DIASTOLIC BLOOD PRESSURE: 82 MMHG | RESPIRATION RATE: 18 BRPM | WEIGHT: 156 LBS

## 2019-10-23 DIAGNOSIS — B18.2 CHRONIC HEPATITIS C WITHOUT HEPATIC COMA (HCC): ICD-10-CM

## 2019-10-23 DIAGNOSIS — F52.21 ERECTILE DYSFUNCTION OF NON-ORGANIC ORIGIN: ICD-10-CM

## 2019-10-23 DIAGNOSIS — I10 BENIGN ESSENTIAL HYPERTENSION: Primary | ICD-10-CM

## 2019-10-23 DIAGNOSIS — E78.2 HYPERLIPIDEMIA, MIXED: ICD-10-CM

## 2019-10-23 DIAGNOSIS — Z12.5 PROSTATE CANCER SCREENING: ICD-10-CM

## 2019-10-23 PROCEDURE — 3008F BODY MASS INDEX DOCD: CPT | Performed by: NURSE PRACTITIONER

## 2019-10-23 PROCEDURE — 99214 OFFICE O/P EST MOD 30 MIN: CPT | Performed by: NURSE PRACTITIONER

## 2019-10-23 RX ORDER — METOPROLOL SUCCINATE 25 MG/1
25 TABLET, EXTENDED RELEASE ORAL DAILY
Qty: 90 TABLET | Refills: 3 | Status: SHIPPED | OUTPATIENT
Start: 2019-10-23 | End: 2019-11-05 | Stop reason: SDUPTHER

## 2019-10-23 RX ORDER — OLMESARTAN MEDOXOMIL AND HYDROCHLOROTHIAZIDE 40/12.5 40; 12.5 MG/1; MG/1
1 TABLET ORAL DAILY
Qty: 90 TABLET | Refills: 2 | Status: SHIPPED | OUTPATIENT
Start: 2019-10-23 | End: 2019-10-25 | Stop reason: SDUPTHER

## 2019-10-23 NOTE — PATIENT INSTRUCTIONS
Hypertension- advised low salt diet  Stay active  Hyperlipidemia- continue high veggies diet  Check labs  Overweight- encouraged healthy lifestyle with diet and exercise  Due for prostate cancer screening  Follow up in 6 months    Weight Management   AMBULATORY CARE:   Why it is important to manage your weight:  Being overweight increases your risk of health conditions such as heart disease, high blood pressure, type 2 diabetes, and certain types of cancer  It can also increase your risk for osteoarthritis, sleep apnea, and other respiratory problems  Aim for a slow, steady weight loss  Even a small amount of weight loss can lower your risk of health problems  How to lose weight safely:  A safe and healthy way to lose weight is to eat fewer calories and get regular exercise  You can lose up about 1 pound a week by decreasing the number of calories you eat by 500 calories each day  You can decrease calories by eating smaller portion sizes or by cutting out high-calorie foods  Read labels to find out how many calories are in the foods you eat  You can also burn calories with exercise such as walking, swimming, or biking  You will be more likely to keep weight off if you make these changes part of your lifestyle  Healthy meal plan for weight management:  A healthy meal plan includes a variety of foods, contains fewer calories, and helps you stay healthy  A healthy meal plan includes the following:  · Eat whole-grain foods more often  A healthy meal plan should contain fiber  Fiber is the part of grains, fruits, and vegetables that is not broken down by your body  Whole-grain foods are healthy and provide extra fiber in your diet  Some examples of whole-grain foods are whole-wheat breads and pastas, oatmeal, brown rice, and bulgur  · Eat a variety of vegetables every day  Include dark, leafy greens such as spinach, kale, brittany greens, and mustard greens   Eat yellow and orange vegetables such as carrots, sweet potatoes, and winter squash  · Eat a variety of fruits every day  Choose fresh or canned fruit (canned in its own juice or light syrup) instead of juice  Fruit juice has very little or no fiber  · Eat low-fat dairy foods  Drink fat-free (skim) milk or 1% milk  Eat fat-free yogurt and low-fat cottage cheese  Try low-fat cheeses such as mozzarella and other reduced-fat cheeses  · Choose meat and other protein foods that are low in fat  Choose beans or other legumes such as split peas or lentils  Choose fish, skinless poultry (chicken or turkey), or lean cuts of red meat (beef or pork)  Before you cook meat or poultry, cut off any visible fat  · Use less fat and oil  Try baking foods instead of frying them  Add less fat, such as margarine, sour cream, regular salad dressing and mayonnaise to foods  Eat fewer high-fat foods  Some examples of high-fat foods include french fries, doughnuts, ice cream, and cakes  · Eat fewer sweets  Limit foods and drinks that are high in sugar  This includes candy, cookies, regular soda, and sweetened drinks  Ways to decrease calories:   · Eat smaller portions  ¨ Use a small plate with smaller servings  ¨ Do not eat second helpings  ¨ When you eat at a restaurant, ask for a box and place half of your meal in the box before you eat  ¨ Share an entrée with someone else  · Replace high-calorie snacks with healthy, low-calorie snacks  ¨ Choose fresh fruit, vegetables, fat-free rice cakes, or air-popped popcorn instead of potato chips, nuts, or chocolate  ¨ Choose water or calorie-free drinks instead of soda or sweetened drinks  · Eat regular meals  Skipping meals can lead to overeating later in the day  Eat a healthy snack in place of a meal if you do not have time to eat a regular meal      · Do not shop for groceries when you are hungry  You may be more likely to make unhealthy food choices   Take a grocery list of healthy foods and shop after you have eaten  Exercise:  Exercise at least 30 minutes per day on most days of the week  Some examples of exercise include walking, biking, dancing, and swimming  You can also fit in more physical activity by taking the stairs instead of the elevator or parking farther away from stores  Ask your healthcare provider about the best exercise plan for you  Other things to consider as you try to lose weight:   · Be aware of situations that may give you the urge to overeat, such as eating while watching television  Find ways to avoid these situations  For example, read a book, go for a walk, or do crafts  · Meet with a weight loss support group or friends who are also trying to lose weight  This may help you stay motivated to continue working on your weight loss goals  © 2017 [employer] Information is for End User's use only and may not be sold, redistributed or otherwise used for commercial purposes  All illustrations and images included in CareNotes® are the copyrighted property of [employer]  or [employer]  The above information is an  only  It is not intended as medical advice for individual conditions or treatments  Talk to your doctor, nurse or pharmacist before following any medical regimen to see if it is safe and effective for you  Low Fat Diet   AMBULATORY CARE:   A low-fat diet  is an eating plan that is low in total fat, unhealthy fat, and cholesterol  You may need to follow a low-fat diet if you have trouble digesting or absorbing fat  You may also need to follow this diet if you have high cholesterol  You can also lower your cholesterol by increasing the amount of fiber in your diet  Soluble fiber is a type of fiber that helps to decrease cholesterol levels  Different types of fat in food:   · Limit unhealthy fats  A diet that is high in cholesterol, saturated fat, and trans fat may cause unhealthy cholesterol levels   Unhealthy cholesterol levels increase your risk of heart disease  ¨ Cholesterol:  Limit intake of cholesterol to less than 200 mg per day  Cholesterol is found in meat, eggs, and dairy  ¨ Saturated fat:  Limit saturated fat to less than 7% of your total daily calories  Ask your dietitian how many calories you need each day  Saturated fat is found in butter, cheese, ice cream, whole milk, and palm oil  Saturated fat is also found in meat, such as beef, pork, chicken skin, and processed meats  Processed meats include sausage, hot dogs, and bologna  ¨ Trans fat:  Avoid trans fat as much as possible  Trans fat is used in fried and baked foods  Foods that say trans fat free on the label may still have up to 0 5 grams of trans fat per serving  · Include healthy fats  Replace foods that are high in saturated and trans fat with foods high in healthy fats  This may help to decrease high cholesterol levels  ¨ Monounsaturated fats: These are found in avocados, nuts, and vegetable oils, such as olive, canola, and sunflower oil  ¨ Polyunsaturated fats: These can be found in vegetable oils, such as soybean or corn oil  Omega-3 fats can help to decrease the risk of heart disease  Omega-3 fats are found in fish, such as salmon, herring, trout, and tuna  Omega-3 fats can also be found in plant foods, such as walnuts, flaxseed, soybeans, and canola oil    Foods to limit or avoid:   · Grains:      ¨ Snacks that are made with partially hydrogenated oils, such as chips, regular crackers, and butter-flavored popcorn    ¨ High-fat baked goods, such as biscuits, croissants, doughnuts, pies, cookies, and pastries    · Dairy:      ¨ Whole milk, 2% milk, and yogurt and ice cream made with whole milk    ¨ Half and half creamer, heavy cream, and whipping cream    ¨ Cheese, cream cheese, and sour cream    · Meats and proteins:      ¨ High-fat cuts of meat (T-bone steak, regular hamburger, and ribs)    ¨ [employer], poultry (turkey and chicken), and fish    ¨ Poultry (chicken and turkey) with skin    ¨ Cold cuts (salami or bologna), hot dogs, oleary, and sausage    ¨ Whole eggs and egg yolks    · Vegetables and fruits with added fat:      ¨ Fried vegetables or vegetables in butter or high-fat sauces, such as cream or cheese sauces    ¨ Fried fruit or fruit served with butter or cream    · Fats:      ¨ Butter, stick margarine, and shortening    ¨ Coconut, palm oil, and palm kernel oil  Foods to include:   · Grains:      ¨ Whole-grain breads, cereals, pasta, and brown rice    ¨ Low-fat crackers and pretzels    · Vegetables and fruits:      ¨ Fresh, frozen, or canned vegetables (no salt or low-sodium)    ¨ Fresh, frozen, dried, or canned fruit (canned in light syrup or fruit juice)    ¨ Avocado    · Low-fat dairy products:      ¨ Nonfat (skim) or 1% milk    ¨ Nonfat or low-fat cheese, yogurt, and cottage cheese    · Meats and proteins:      ¨ Chicken or turkey with no skin    ¨ Baked or broiled fish    ¨ Lean beef and pork (loin, round, extra lean hamburger)    ¨ Beans and peas, unsalted nuts, soy products    ¨ Egg whites and substitutes    ¨ Seeds and nuts    · Fats:      ¨ Unsaturated oil, such as canola, olive, peanut, soybean, or sunflower oil    ¨ Soft or liquid margarine and vegetable oil spread    ¨ Low-fat salad dressing  Other ways to decrease fat:   · Read food labels before you buy foods  Choose foods that have less than 30% of calories from fat  Choose low-fat or fat-free dairy products  Remember that fat free does not mean calorie free  These foods still contain calories, and too many calories can lead to weight gain  · Trim fat from meat and avoid fried food  Trim all visible fat from meat before you cook it  Remove the skin from poultry  Do not collins meat, fish, or poultry  Bake, roast, boil, or broil these foods instead  Avoid fried foods  Eat a baked potato instead of [place] fries  Steam vegetables instead of sautéing them in butter  · Add less fat to foods  Use imitation oleary bits on salads and baked potatoes instead of regular oleary bits  Use fat-free or low-fat salad dressings instead of regular dressings  Use low-fat or nonfat butter-flavored topping instead of regular butter or margarine on popcorn and other foods  Ways to decrease fat in recipes:  Replace high-fat ingredients with low-fat or nonfat ones  This may cause baked goods to be drier than usual  You may need to use nonfat cooking spray on pans to prevent food from sticking  You also may need to change the amount of other ingredients, such as water, in the recipe  Try the following:  · Use low-fat or light margarine instead of regular margarine or shortening  · Use lean ground turkey breast or chicken, or lean ground beef (less than 5% fat) instead of hamburger  · Add 1 teaspoon of canola oil to 8 ounces of skim milk instead of using cream or half and half  · Use grated zucchini, carrots, or apples in breads instead of coconut  · Use blenderized, low-fat cottage cheese, plain tofu, or low-fat ricotta cheese instead of cream cheese  · Use 1 egg white and 1 teaspoon of canola oil, or use ¼ cup (2 ounces) of fat-free egg substitute instead of a whole egg  · Replace half of the oil that is called for in a recipe with applesauce when you bake  Use 3 tablespoons of cocoa powder and 1 tablespoon of canola oil instead of a square of baking chocolate  How to increase fiber:  Eat enough high-fiber foods to get 20 to 30 grams of fiber every day  Slowly increase your fiber intake to avoid stomach cramps, gas, and other problems  · Eat 3 ounces of whole-grain foods each day  An ounce is about 1 slice of bread  Eat whole-grain breads, such as whole-wheat bread  Whole wheat, whole-wheat flour, or other whole grains should be listed as the first ingredient on the food label  Replace white flour with whole-grain flour or use half of each in recipes   Whole-grain flour is heavier than white flour, so you may have to add more yeast or baking powder  · Eat a high-fiber cereal for breakfast   Oatmeal is a good source of soluble fiber  Look for cereals that have bran or fiber in the name  Choose whole-grain products, such as brown rice, barley, and whole-wheat pasta  · Eat more beans, peas, and lentils  For example, add beans to soups or salads  Eat at least 5 cups of fruits and vegetables each day  Eat fruits and vegetables with the peel because the peel is high in fiber  © 2017 [employer] Information is for End User's use only and may not be sold, redistributed or otherwise used for commercial purposes  All illustrations and images included in CareNotes® are the copyrighted property of [employer]  or [employer]  The above information is an  only  It is not intended as medical advice for individual conditions or treatments  Talk to your doctor, nurse or pharmacist before following any medical regimen to see if it is safe and effective for you  Heart Healthy Diet   AMBULATORY CARE:   A heart healthy diet  is an eating plan low in total fat, unhealthy fats, and sodium (salt)  A heart healthy diet helps decrease your risk for heart disease and stroke  Limit the amount of fat you eat to 25% to 35% of your total daily calories  Limit sodium to less than 2,300 mg each day  Healthy fats:  Healthy fats can help improve cholesterol levels  The risk for heart disease is decreased when cholesterol levels are normal  Choose healthy fats, such as the following:  · Unsaturated fat  is found in foods such as soybean, canola, olive, corn, and safflower oils  It is also found in soft tub margarine that is made with liquid vegetable oil  · Omega-3 fat  is found in certain fish, such as salmon, tuna, and trout, and in walnuts and flaxseed    Unhealthy fats:  Unhealthy fats can cause unhealthy cholesterol levels in your blood and increase your risk of heart disease  Limit unhealthy fats, such as the following:  · Cholesterol  is found in animal foods, such as eggs and lobster, and in dairy products made from whole milk  Limit cholesterol to less than 300 milligrams (mg) each day  You may need to limit cholesterol to 200 mg each day if you have heart disease  · Saturated fat  is found in meats, such as oleary and hamburger  It is also found in chicken or turkey skin, whole milk, and butter  Limit saturated fat to less than 7% of your total daily calories  Limit saturated fat to less than 6% if you have heart disease or are at increased risk for it  · Trans fat  is found in packaged foods, such as potato chips and cookies  It is also in hard margarine, some fried foods, and shortening  Avoid trans fats as much as possible    Heart healthy foods and drinks to include:  Ask your dietitian or healthcare provider how many servings to have from each of the following food groups:  · Grains:      ¨ Whole-wheat breads, cereals, and pastas, and brown rice    ¨ Low-fat, low-sodium crackers and chips    · Vegetables:      ¨ Broccoli, green beans, green peas, and spinach    ¨ Collards, kale, and lima beans    ¨ Carrots, sweet potatoes, tomatoes, and peppers    ¨ Canned vegetables with no salt added    · Fruits:      ¨ Bananas, peaches, pears, and pineapple    ¨ Grapes, raisins, and dates    ¨ Oranges, tangerines, grapefruit, orange juice, and grapefruit juice    ¨ Apricots, mangoes, melons, and papaya    ¨ Raspberries and strawberries    ¨ Canned fruit with no added sugar    · Low-fat dairy products:      ¨ Nonfat (skim) milk, 1% milk, and low-fat almond, cashew, or soy milks fortified with calcium    ¨ Low-fat cheese, regular or frozen yogurt, and cottage cheese    · Meats and proteins , such as lean cuts of beef and pork (loin, leg, round), skinless chicken and turkey, legumes, soy products, egg whites, and nuts  Foods and drinks to limit or avoid:  Ask your dietitian or healthcare provider about these and other foods that are high in unhealthy fat, sodium, and sugar:  · Snack or packaged foods , such as frozen dinners, cookies, macaroni and cheese, and cereals with more than 300 mg of sodium per serving    · Canned or dry mixes  for cakes, soups, sauces, or gravies    · Vegetables with added sodium , such as instant potatoes, vegetables with added sauces, or regular canned vegetables    · Other foods high in sodium , such as ketchup, barbecue sauce, salad dressing, pickles, olives, soy sauce, and miso    · High-fat dairy foods  such as whole or 2% milk, cream cheese, or sour cream, and cheeses     · High-fat protein foods  such as high-fat cuts of beef (T-bone steaks, ribs), chicken or turkey with skin, and organ meats, such as liver    · Cured or smoked meats , such as hot dogs, oleary, and sausage    · Unhealthy fats and oils , such as butter, stick margarine, shortening, and cooking oils such as coconut or palm oil    · Food and drinks high in sugar , such as soft drinks (soda), sports drinks, sweetened tea, candy, cake, cookies, pies, and doughnuts  Other diet guidelines to follow:   · Eat more foods containing omega-3 fats  Eat fish high in omega-3 fats at least 2 times a week  · Limit alcohol  Too much alcohol can damage your heart and raise your blood pressure  Women should limit alcohol to 1 drink a day  Men should limit alcohol to 2 drinks a day  A drink of alcohol is 12 ounces of beer, 5 ounces of wine, or 1½ ounces of liquor  · Choose low-sodium foods  High-sodium foods can lead to high blood pressure  Add little or no salt to food you prepare  Use herbs and spices in place of salt  · Eat more fiber  to help lower cholesterol levels  Eat at least 5 servings of fruits and vegetables each day  Eat 3 ounces of whole-grain foods each day  Legumes (beans) are also a good source of fiber  Lifestyle guidelines:   · Do not smoke  Nicotine and other chemicals in cigarettes and cigars can cause lung and heart damage  Ask your healthcare provider for information if you currently smoke and need help to quit  E-cigarettes or smokeless tobacco still contain nicotine  Talk to your healthcare provider before you use these products  · Exercise regularly  to help you maintain a healthy weight and improve your blood pressure and cholesterol levels  Ask your healthcare provider about the best exercise plan for you  Do not start an exercise program without asking your healthcare provider  Follow up with your healthcare provider as directed:  Write down your questions so you remember to ask them during your visits  © 2017 [employer] [facility] Information is for End User's use only and may not be sold, redistributed or otherwise used for commercial purposes  All illustrations and images included in CareNotes® are the copyrighted property of [employer]  or [employer]  The above information is an  only  It is not intended as medical advice for individual conditions or treatments  Talk to your doctor, nurse or pharmacist before following any medical regimen to see if it is safe and effective for you  Calorie Counting Diet   WHAT YOU NEED TO KNOW:   What is a calorie counting diet? It is a meal plan based on counting calories each day to reach a healthy body weight  You will need to eat fewer calories if you are trying to lose weight  Weight loss may decrease your risk for certain health problems or improve your health if you have health problems  Some of these health problems include heart disease, high blood pressure, and diabetes  What foods should I avoid? Your dietitian will tell you if you need to avoid certain foods based on your body weight and health condition  You may need to avoid high-fat foods if you are at risk for or have heart disease   You may need to eat fewer foods from the breads and starches food group if you have diabetes  How many calories are in foods? The following is a list of foods and drinks with the approximate number of calories in each  Check the food label to find the exact number of calories  A dietitian can tell you how many calories you should have from each food group each day    · Carbohydrate:      ¨ ½ of a 3-inch bagel, 1 slice of bread, or ½ of a hamburger bun or hot dog bun (80)    ¨ 1 (8-inch) flour tortilla or ½ cup of cooked rice (100)    ¨ 1 (6-inch) corn tortilla (80)    ¨ 1 (6-inch) pancake or 1 cup of bran flakes cereal (110)    ¨ ½ cup of cooked cereal (80)    ¨ ½ cup of cooked pasta (85)    ¨ 1 ounce of pretzels (100)    ¨ 3 cups of air-popped popcorn without butter or oil (80)    · Dairy:      ¨ 1 cup of skim or 1% milk (90)    ¨ 1 cup of 2% milk (120)    ¨ 1 cup of whole milk (160)    ¨ 1 cup of 2% chocolate milk (220)    ¨ 1 ounce of low-fat cheese with 3 grams of fat per ounce (70)    ¨ 1 ounce of cheddar cheese (114)    ¨ ½ cup of 1% fat cottage cheese (80)    ¨ 1 cup of plain or sugar-free, fat-free yogurt (90)    · Protein foods:      ¨ 3 ounces of fish (not breaded or fried) (95)    ¨ 3 ounces of breaded, fried fish (195)    ¨ ¾ cup of tuna canned in water (105)    ¨ 3 ounces of chicken breast without skin (105)    ¨ 1 fried chicken breast with skin (350)    ¨ ¼ cup of fat free egg substitute (40)    ¨ 1 large egg (75)    ¨ 3 ounces of lean beef or pork (165)    ¨ 3 ounces of fried pork chop or ham (185)    ¨ ½ cup of cooked dried beans, such as kidney, white, lentils, or navy (115)    ¨ 3 ounces of bologna or lunch meat (225)    ¨ 2 links of breakfast sausage (140)    · Vegetables:      ¨ ½ cup of sliced mushrooms (10)    ¨ 1 cup of salad greens, such as lettuce, spinach, or camilo (15)    ¨ ½ cup of steamed asparagus (20)    ¨ ½ cup of cooked summer squash, zucchini squash, or green or wax beans (25)    ¨ 1 cup of broccoli or cauliflower florets, or 1 medium tomato (25)    ¨ 1 large raw carrot or ½ cup of cooked carrots (40)    ¨ ? of a medium cucumber or 1 stalk of celery (5)    ¨ 1 small baked potato (160)    ¨ 1 cup of breaded, fried vegetables (230)    · Fruit:      ¨ 1 (6-inch) banana (55)     ¨ ½ of a 4-inch grapefruit (55)    ¨ 15 grapes (60)    ¨ 1 medium orange or apple (70)    ¨ 1 large peach (65)    ¨ 1 cup of fresh pineapple chunks (75)    ¨ 1 cup of melon cubes (50)    ¨ 1¼ cups of whole strawberries (45)    ¨ ½ cup of fruit canned in juice (55)    ¨ ½ cup of fruit canned in heavy syrup (110)    ¨ ?  cup of raisins (130)    ¨ ½ cup of unsweetened fruit juice (60)    ¨ ½ cup of grape, cranberry, or prune juice (90)    · Fat:      ¨ 10 peanuts or 2 teaspoons of peanut butter (55)    ¨ 2 tablespoons of avocado or 1 tablespoon of regular salad dressing (45)    ¨ 2 slices of oleary (90)    ¨ 1 teaspoon of oil, such as safflower, canola, corn, or olive oil (45)    ¨ 2 teaspoons of low-fat margarine, or 1 tablespoon of low-fat mayonnaise (50)    ¨ 1 teaspoon of regular margarine (40)    ¨ 1 tablespoon of regular mayonnaise (135)    ¨ 1 tablespoon of cream cheese or 2 tablespoons of low-fat cream cheese (45)    ¨ 2 tablespoons of vegetable shortening (215)    · Dessert and sweets:      ¨ 8 animal crackers or 5 vanilla wafers (80)    ¨ 1 frozen fruit juice bar (80)    ¨ ½ cup of ice milk or low-fat frozen yogurt (90)    ¨ ½ cup of sherbet or sorbet (125)    ¨ ½ cup of sugar-free pudding or custard (60)    ¨ ½ cup of ice cream (140)    ¨ ½ cup of pudding or custard (175)    ¨ 1 (2-inch) square chocolate brownie (185)    · Combination foods:      ¨ Bean burrito made with an 8-inch tortilla, without cheese (275)    ¨ Chicken breast sandwich with lettuce and tomato (325)    ¨ 1 cup of chicken noodle soup (60)    ¨ 1 beef taco (175)    ¨ Regular hamburger with lettuce and tomato (310)    ¨ Regular cheeseburger with lettuce and tomato (410)     ¨ ¼ of a 12-inch cheese pizza (280)    ¨ Fried fish sandwich with lettuce and tomato (425)    ¨ Hot dog and bun (275)    ¨ 1½ cups of macaroni and cheese (310)    ¨ Taco salad with a fried tortilla shell (870)    · Low-calorie foods:      ¨ 1 tablespoon of ketchup or 1 tablespoon of fat free sour cream (15)    ¨ 1 teaspoon of mustard (5)    ¨ ¼ cup of salsa (20)    ¨ 1 large dill pickle (15)    ¨ 1 tablespoon of fat free salad dressing (10)    ¨ 2 teaspoons of low-sugar, light jam or jelly, or 1 tablespoon of sugar-free syrup (15)    ¨ 1 sugar-free popsicle (15)    ¨ 1 cup of club soda, seltzer water, or diet soda (0)  CARE AGREEMENT:   You have the right to help plan your care  Discuss treatment options with your caregivers to decide what care you want to receive  You always have the right to refuse treatment  The above information is an  only  It is not intended as medical advice for individual conditions or treatments  Talk to your doctor, nurse or pharmacist before following any medical regimen to see if it is safe and effective for you  © 2017 [employer] Information is for End User's use only and may not be sold, redistributed or otherwise used for commercial purposes  All illustrations and images included in CareNotes® are the copyrighted property of [employer]  or Dax Mao

## 2019-10-24 ENCOUNTER — TELEPHONE (OUTPATIENT)
Dept: FAMILY MEDICINE CLINIC | Facility: CLINIC | Age: 59
End: 2019-10-24

## 2019-10-24 NOTE — TELEPHONE ENCOUNTER
Damaso faxed us and said that generic Olmesart HCT 40/12 5 is on back order they requested an alternative

## 2019-10-25 DIAGNOSIS — I10 BENIGN ESSENTIAL HYPERTENSION: ICD-10-CM

## 2019-10-25 RX ORDER — OLMESARTAN MEDOXOMIL AND HYDROCHLOROTHIAZIDE 40/12.5 40; 12.5 MG/1; MG/1
1 TABLET ORAL DAILY
Qty: 90 TABLET | Refills: 3 | Status: SHIPPED | OUTPATIENT
Start: 2019-10-25 | End: 2019-10-27 | Stop reason: ALTCHOICE

## 2019-10-27 DIAGNOSIS — I10 BENIGN ESSENTIAL HYPERTENSION: Primary | ICD-10-CM

## 2019-10-27 RX ORDER — HYDROCHLOROTHIAZIDE 12.5 MG/1
12.5 TABLET ORAL DAILY
Qty: 90 TABLET | Refills: 2 | Status: SHIPPED | OUTPATIENT
Start: 2019-10-27 | End: 2020-07-20

## 2019-10-27 RX ORDER — OLMESARTAN MEDOXOMIL 40 MG/1
40 TABLET ORAL DAILY
Qty: 90 TABLET | Refills: 2 | Status: SHIPPED | OUTPATIENT
Start: 2019-10-27 | End: 2020-07-20

## 2019-10-28 ENCOUNTER — TELEPHONE (OUTPATIENT)
Dept: FAMILY MEDICINE CLINIC | Facility: CLINIC | Age: 59
End: 2019-10-28

## 2019-10-28 NOTE — TELEPHONE ENCOUNTER
[facility] calls stating the benicar is still on backorder   Please erx 90 day supply of one of the following alternatives:  Candesartan hctz 32-12 5, losartan hctz 100-12 5, or telmisartan hctz 80-12 5    thank you  Rb

## 2019-10-28 NOTE — TELEPHONE ENCOUNTER
Spoke with Mirian Claire and he wants the 2 separate medications to the [facility] in [place]  So everything is fine the way it is

## 2019-10-28 NOTE — TELEPHONE ENCOUNTER
Am I able to split the combo? I ordered the original medication, but split the combo and sent it Friday  I am not sure that it went to mail away though  IS this ok or does he want one of the alternatives listed?

## 2019-10-29 ENCOUNTER — APPOINTMENT (OUTPATIENT)
Dept: LAB | Facility: CLINIC | Age: 59
End: 2019-10-29
Payer: COMMERCIAL

## 2019-10-29 DIAGNOSIS — B18.2 CHRONIC HEPATITIS C WITHOUT HEPATIC COMA (HCC): ICD-10-CM

## 2019-10-29 DIAGNOSIS — E78.2 HYPERLIPIDEMIA, MIXED: ICD-10-CM

## 2019-10-29 DIAGNOSIS — I10 BENIGN ESSENTIAL HYPERTENSION: ICD-10-CM

## 2019-10-29 DIAGNOSIS — Z12.5 PROSTATE CANCER SCREENING: ICD-10-CM

## 2019-10-29 DIAGNOSIS — F52.21 ERECTILE DYSFUNCTION OF NON-ORGANIC ORIGIN: ICD-10-CM

## 2019-10-29 LAB
ALBUMIN SERPL BCP-MCNC: 3.8 G/DL (ref 3.5–5)
ALP SERPL-CCNC: 95 U/L (ref 46–116)
ALT SERPL W P-5'-P-CCNC: 41 U/L (ref 12–78)
ANION GAP SERPL CALCULATED.3IONS-SCNC: 7 MMOL/L (ref 4–13)
AST SERPL W P-5'-P-CCNC: 35 U/L (ref 5–45)
BILIRUB SERPL-MCNC: 0.33 MG/DL (ref 0.2–1)
BUN SERPL-MCNC: 21 MG/DL (ref 5–25)
CALCIUM SERPL-MCNC: 8.9 MG/DL (ref 8.3–10.1)
CHLORIDE SERPL-SCNC: 107 MMOL/L (ref 100–108)
CHOLEST SERPL-MCNC: 213 MG/DL (ref 50–200)
CO2 SERPL-SCNC: 23 MMOL/L (ref 21–32)
CREAT SERPL-MCNC: 1 MG/DL (ref 0.6–1.3)
GFR SERPL CREATININE-BSD FRML MDRD: 82 ML/MIN/1.73SQ M
GLUCOSE P FAST SERPL-MCNC: 102 MG/DL (ref 65–99)
HDLC SERPL-MCNC: 49 MG/DL
NONHDLC SERPL-MCNC: 164 MG/DL
POTASSIUM SERPL-SCNC: 4.3 MMOL/L (ref 3.5–5.3)
PROT SERPL-MCNC: 7.9 G/DL (ref 6.4–8.2)
PSA SERPL-MCNC: 0.6 NG/ML (ref 0–4)
SODIUM SERPL-SCNC: 137 MMOL/L (ref 136–145)
TRIGL SERPL-MCNC: 487 MG/DL

## 2019-10-29 PROCEDURE — 80061 LIPID PANEL: CPT

## 2019-10-29 PROCEDURE — 80053 COMPREHEN METABOLIC PANEL: CPT

## 2019-10-29 PROCEDURE — 36415 COLL VENOUS BLD VENIPUNCTURE: CPT

## 2019-10-29 PROCEDURE — G0103 PSA SCREENING: HCPCS

## 2019-11-05 DIAGNOSIS — I10 BENIGN ESSENTIAL HYPERTENSION: ICD-10-CM

## 2019-11-05 RX ORDER — METOPROLOL SUCCINATE 25 MG/1
25 TABLET, EXTENDED RELEASE ORAL DAILY
Qty: 90 TABLET | Refills: 3 | Status: SHIPPED | OUTPATIENT
Start: 2019-11-05 | End: 2020-06-08 | Stop reason: SDUPTHER

## 2020-06-08 ENCOUNTER — OFFICE VISIT (OUTPATIENT)
Dept: FAMILY MEDICINE CLINIC | Facility: CLINIC | Age: 60
End: 2020-06-08
Payer: COMMERCIAL

## 2020-06-08 VITALS
WEIGHT: 158 LBS | SYSTOLIC BLOOD PRESSURE: 162 MMHG | HEIGHT: 66 IN | DIASTOLIC BLOOD PRESSURE: 90 MMHG | BODY MASS INDEX: 25.39 KG/M2 | TEMPERATURE: 98.2 F | HEART RATE: 80 BPM | RESPIRATION RATE: 18 BRPM

## 2020-06-08 DIAGNOSIS — N52.9 ERECTILE DYSFUNCTION, UNSPECIFIED ERECTILE DYSFUNCTION TYPE: ICD-10-CM

## 2020-06-08 DIAGNOSIS — I10 BENIGN ESSENTIAL HYPERTENSION: Primary | ICD-10-CM

## 2020-06-08 DIAGNOSIS — E78.2 HYPERLIPIDEMIA, MIXED: ICD-10-CM

## 2020-06-08 DIAGNOSIS — Z12.5 PROSTATE CANCER SCREENING: ICD-10-CM

## 2020-06-08 PROCEDURE — 99214 OFFICE O/P EST MOD 30 MIN: CPT | Performed by: NURSE PRACTITIONER

## 2020-06-08 PROCEDURE — 3008F BODY MASS INDEX DOCD: CPT | Performed by: NURSE PRACTITIONER

## 2020-06-08 PROCEDURE — 1036F TOBACCO NON-USER: CPT | Performed by: NURSE PRACTITIONER

## 2020-06-08 PROCEDURE — 3080F DIAST BP >= 90 MM HG: CPT | Performed by: NURSE PRACTITIONER

## 2020-06-08 PROCEDURE — 3077F SYST BP >= 140 MM HG: CPT | Performed by: NURSE PRACTITIONER

## 2020-06-08 RX ORDER — OLMESARTAN MEDOXOMIL AND HYDROCHLOROTHIAZIDE 40/12.5 40; 12.5 MG/1; MG/1
1 TABLET ORAL DAILY
Qty: 90 TABLET | Refills: 2 | Status: SHIPPED | OUTPATIENT
Start: 2020-06-08 | End: 2020-09-30 | Stop reason: SDUPTHER

## 2020-06-08 RX ORDER — VARDENAFIL HYDROCHLORIDE 20 MG/1
20 TABLET ORAL DAILY PRN
Qty: 10 TABLET | Refills: 0 | Status: SHIPPED | OUTPATIENT
Start: 2020-06-08 | End: 2021-09-20 | Stop reason: SDUPTHER

## 2020-06-08 RX ORDER — METOPROLOL SUCCINATE 25 MG/1
25 TABLET, EXTENDED RELEASE ORAL DAILY
Qty: 90 TABLET | Refills: 3 | Status: SHIPPED | OUTPATIENT
Start: 2020-06-08 | End: 2020-09-30 | Stop reason: SDUPTHER

## 2020-07-19 DIAGNOSIS — I10 BENIGN ESSENTIAL HYPERTENSION: ICD-10-CM

## 2020-07-20 RX ORDER — HYDROCHLOROTHIAZIDE 12.5 MG/1
TABLET ORAL
Qty: 90 TABLET | Refills: 2 | Status: SHIPPED | OUTPATIENT
Start: 2020-07-20 | End: 2020-09-30 | Stop reason: SDUPTHER

## 2020-07-20 RX ORDER — OLMESARTAN MEDOXOMIL 40 MG/1
TABLET ORAL
Qty: 90 TABLET | Refills: 2 | Status: SHIPPED | OUTPATIENT
Start: 2020-07-20 | End: 2020-09-30 | Stop reason: SDUPTHER

## 2020-09-29 NOTE — PROGRESS NOTES
Assessment/Plan:       Diagnoses and all orders for this visit:    Benign essential hypertension  -     Comprehensive metabolic panel; Future  -     olmesartan-hydrochlorothiazide (BENICAR HCT) 40-12 5 MG per tablet; Take 1 tablet by mouth daily  -     olmesartan (BENICAR) 40 mg tablet; Take 1 tablet (40 mg total) by mouth daily  -     metoprolol succinate (TOPROL-XL) 25 mg 24 hr tablet; Take 1 tablet (25 mg total) by mouth daily  -     hydrochlorothiazide (HYDRODIURIL) 12 5 mg tablet; Take 1 tablet (12 5 mg total) by mouth daily    Hyperlipidemia, mixed  -     Lipid panel; Future    BMI 25 0-25 9,adult  -     Lipid panel; Future        No problem-specific Assessment & Plan notes found for this encounter  Subjective:      Patient ID: Christi Everett is a 61 y o  male  Follow up    HTN-stable  Limits salt consumption  HLD-needs labs  The following portions of the patient's history were reviewed and updated as appropriate:   He has a past medical history of Coronary ostial stenosis and Hypertension  ,  does not have any pertinent problems on file  ,   has a past surgical history that includes Chest tube insertion and Hand surgery  ,  family history includes Arthritis in his mother; Diabetes in his father; Multiple sclerosis in his mother  ,   reports that he has never smoked  He has never used smokeless tobacco  He reports current alcohol use  He reports that he does not use drugs  ,  is allergic to claritin-d 24 hour  [loratadine-pseudoephedrine er]     Current Outpatient Medications   Medication Sig Dispense Refill    hydrochlorothiazide (HYDRODIURIL) 12 5 mg tablet Take 1 tablet (12 5 mg total) by mouth daily 90 tablet 3    metoprolol succinate (TOPROL-XL) 25 mg 24 hr tablet Take 1 tablet (25 mg total) by mouth daily 90 tablet 3    olmesartan (BENICAR) 40 mg tablet Take 1 tablet (40 mg total) by mouth daily 90 tablet 3    olmesartan-hydrochlorothiazide (BENICAR HCT) 40-12 5 MG per tablet Take 1 tablet by mouth daily 90 tablet 3    vardenafil (LEVITRA) 20 MG tablet Take 1 tablet (20 mg total) by mouth daily as needed for erectile dysfunction 10 tablet 0     No current facility-administered medications for this visit  Review of Systems   Constitutional: Negative for fatigue and fever  HENT: Negative for congestion  Eyes: Negative for visual disturbance  Respiratory: Negative for cough and shortness of breath  Cardiovascular: Negative for chest pain, palpitations and leg swelling  Gastrointestinal: Negative for abdominal distention and abdominal pain  Endocrine: Negative for cold intolerance, polydipsia and polyuria  Genitourinary: Negative for difficulty urinating  Musculoskeletal: Negative for back pain and joint swelling  Skin: Negative for color change and rash  Allergic/Immunologic: Negative for immunocompromised state  Neurological: Negative for dizziness and headaches  Hematological: Negative for adenopathy  Psychiatric/Behavioral: Negative for behavioral problems and sleep disturbance  All other systems reviewed and are negative  Objective:  Vitals:    09/30/20 1645   BP: 140/84   BP Location: Left arm   Patient Position: Sitting   Pulse: 93   Resp: 18   Temp: (!) 97 °F (36 1 °C)   SpO2: 98%   Weight: 75 4 kg (166 lb 4 oz)   Height: 5' 6" (1 676 m)     Body mass index is 26 83 kg/m²  Physical Exam  Vitals signs and nursing note reviewed  Constitutional:       General: He is not in acute distress  Appearance: Normal appearance  He is not ill-appearing, toxic-appearing or diaphoretic  HENT:      Head: Normocephalic and atraumatic  Right Ear: Tympanic membrane, ear canal and external ear normal       Left Ear: Tympanic membrane, ear canal and external ear normal       Nose: Nose normal  No congestion  Mouth/Throat:      Mouth: Mucous membranes are moist       Pharynx: Oropharynx is clear   No oropharyngeal exudate or posterior oropharyngeal erythema  Eyes:      Conjunctiva/sclera: Conjunctivae normal       Pupils: Pupils are equal, round, and reactive to light  Neck:      Musculoskeletal: Normal range of motion and neck supple  Vascular: No carotid bruit  Cardiovascular:      Rate and Rhythm: Normal rate and regular rhythm  Pulses: Normal pulses  Heart sounds: Normal heart sounds  No murmur  No friction rub  No gallop  Pulmonary:      Effort: Pulmonary effort is normal  No respiratory distress  Breath sounds: Normal breath sounds  No stridor  No wheezing, rhonchi or rales  Chest:      Chest wall: No tenderness  Abdominal:      General: Bowel sounds are normal  There is no distension  Palpations: Abdomen is soft  There is no mass  Tenderness: There is no abdominal tenderness  Musculoskeletal: Normal range of motion  General: No swelling, tenderness, deformity or signs of injury  Lymphadenopathy:      Cervical: No cervical adenopathy  Skin:     General: Skin is warm and dry  Capillary Refill: Capillary refill takes less than 2 seconds  Coloration: Skin is not jaundiced or pale  Findings: No erythema or rash  Neurological:      Mental Status: He is alert  Cranial Nerves: No cranial nerve deficit  Sensory: No sensory deficit  Coordination: Coordination normal    Psychiatric:         Mood and Affect: Mood normal          Behavior: Behavior normal          Thought Content:  Thought content normal          Judgment: Judgment normal

## 2020-09-30 ENCOUNTER — OFFICE VISIT (OUTPATIENT)
Dept: FAMILY MEDICINE CLINIC | Facility: CLINIC | Age: 60
End: 2020-09-30
Payer: COMMERCIAL

## 2020-09-30 VITALS
WEIGHT: 166.25 LBS | SYSTOLIC BLOOD PRESSURE: 140 MMHG | RESPIRATION RATE: 18 BRPM | TEMPERATURE: 97 F | DIASTOLIC BLOOD PRESSURE: 84 MMHG | HEART RATE: 93 BPM | BODY MASS INDEX: 26.72 KG/M2 | HEIGHT: 66 IN | OXYGEN SATURATION: 98 %

## 2020-09-30 DIAGNOSIS — E78.2 HYPERLIPIDEMIA, MIXED: ICD-10-CM

## 2020-09-30 DIAGNOSIS — I10 BENIGN ESSENTIAL HYPERTENSION: Primary | ICD-10-CM

## 2020-09-30 PROCEDURE — 3079F DIAST BP 80-89 MM HG: CPT | Performed by: NURSE PRACTITIONER

## 2020-09-30 PROCEDURE — 1036F TOBACCO NON-USER: CPT | Performed by: NURSE PRACTITIONER

## 2020-09-30 PROCEDURE — 99214 OFFICE O/P EST MOD 30 MIN: CPT | Performed by: NURSE PRACTITIONER

## 2020-09-30 RX ORDER — METOPROLOL SUCCINATE 25 MG/1
25 TABLET, EXTENDED RELEASE ORAL DAILY
Qty: 90 TABLET | Refills: 3 | Status: SHIPPED | OUTPATIENT
Start: 2020-09-30 | End: 2021-02-03 | Stop reason: SDUPTHER

## 2020-09-30 RX ORDER — HYDROCHLOROTHIAZIDE 12.5 MG/1
12.5 TABLET ORAL DAILY
Qty: 90 TABLET | Refills: 3 | Status: SHIPPED | OUTPATIENT
Start: 2020-09-30 | End: 2021-03-29 | Stop reason: ALTCHOICE

## 2020-09-30 RX ORDER — OLMESARTAN MEDOXOMIL AND HYDROCHLOROTHIAZIDE 40/12.5 40; 12.5 MG/1; MG/1
1 TABLET ORAL DAILY
Qty: 90 TABLET | Refills: 3 | Status: SHIPPED | OUTPATIENT
Start: 2020-09-30 | End: 2021-10-06

## 2020-09-30 RX ORDER — OLMESARTAN MEDOXOMIL 40 MG/1
40 TABLET ORAL DAILY
Qty: 90 TABLET | Refills: 3 | Status: SHIPPED | OUTPATIENT
Start: 2020-09-30 | End: 2021-03-29 | Stop reason: ALTCHOICE

## 2020-09-30 NOTE — PATIENT INSTRUCTIONS
Follow up    HTN- stable  Hyperlipidemia-continue diet high in veggies and fruits  Only lean meats  ASCVD risk score is high--patient is agreeable to starting cholesterol medication if his labs show elevated LDL    Follow up in 6 months  Obtain labs now and our office will with results

## 2020-10-06 ENCOUNTER — APPOINTMENT (OUTPATIENT)
Dept: LAB | Facility: CLINIC | Age: 60
End: 2020-10-06
Payer: COMMERCIAL

## 2020-10-06 DIAGNOSIS — E78.2 HYPERLIPIDEMIA, MIXED: ICD-10-CM

## 2020-10-06 DIAGNOSIS — I10 BENIGN ESSENTIAL HYPERTENSION: ICD-10-CM

## 2020-10-06 DIAGNOSIS — Z12.5 PROSTATE CANCER SCREENING: ICD-10-CM

## 2020-10-06 DIAGNOSIS — E78.2 MIXED HYPERLIPIDEMIA: Primary | ICD-10-CM

## 2020-10-06 LAB
ALBUMIN SERPL BCP-MCNC: 3.9 G/DL (ref 3.5–5)
ALP SERPL-CCNC: 107 U/L (ref 46–116)
ALT SERPL W P-5'-P-CCNC: 45 U/L (ref 12–78)
ANION GAP SERPL CALCULATED.3IONS-SCNC: 2 MMOL/L (ref 4–13)
AST SERPL W P-5'-P-CCNC: 36 U/L (ref 5–45)
BILIRUB SERPL-MCNC: 0.48 MG/DL (ref 0.2–1)
BUN SERPL-MCNC: 18 MG/DL (ref 5–25)
CALCIUM SERPL-MCNC: 9.4 MG/DL (ref 8.3–10.1)
CHLORIDE SERPL-SCNC: 109 MMOL/L (ref 100–108)
CHOLEST SERPL-MCNC: 249 MG/DL (ref 50–200)
CO2 SERPL-SCNC: 29 MMOL/L (ref 21–32)
CREAT SERPL-MCNC: 0.82 MG/DL (ref 0.6–1.3)
GFR SERPL CREATININE-BSD FRML MDRD: 97 ML/MIN/1.73SQ M
GLUCOSE P FAST SERPL-MCNC: 104 MG/DL (ref 65–99)
HDLC SERPL-MCNC: 60 MG/DL
LDLC SERPL CALC-MCNC: 145 MG/DL (ref 0–100)
NONHDLC SERPL-MCNC: 189 MG/DL
POTASSIUM SERPL-SCNC: 4.6 MMOL/L (ref 3.5–5.3)
PROT SERPL-MCNC: 8.4 G/DL (ref 6.4–8.2)
PSA SERPL-MCNC: 0.5 NG/ML (ref 0–4)
SODIUM SERPL-SCNC: 140 MMOL/L (ref 136–145)
TRIGL SERPL-MCNC: 222 MG/DL

## 2020-10-06 PROCEDURE — 80053 COMPREHEN METABOLIC PANEL: CPT

## 2020-10-06 PROCEDURE — G0103 PSA SCREENING: HCPCS

## 2020-10-06 PROCEDURE — 80061 LIPID PANEL: CPT

## 2020-10-06 PROCEDURE — 36415 COLL VENOUS BLD VENIPUNCTURE: CPT

## 2020-10-06 RX ORDER — ATORVASTATIN CALCIUM 10 MG/1
10 TABLET, FILM COATED ORAL DAILY
Qty: 90 TABLET | Refills: 3 | Status: SHIPPED | OUTPATIENT
Start: 2020-10-06 | End: 2021-03-29 | Stop reason: ALTCHOICE

## 2021-02-03 DIAGNOSIS — I10 BENIGN ESSENTIAL HYPERTENSION: ICD-10-CM

## 2021-02-03 RX ORDER — METOPROLOL SUCCINATE 25 MG/1
25 TABLET, EXTENDED RELEASE ORAL DAILY
Qty: 90 TABLET | Refills: 3 | Status: SHIPPED | OUTPATIENT
Start: 2021-02-03 | End: 2021-10-19 | Stop reason: SDUPTHER

## 2021-03-29 ENCOUNTER — OFFICE VISIT (OUTPATIENT)
Dept: FAMILY MEDICINE CLINIC | Facility: CLINIC | Age: 61
End: 2021-03-29
Payer: COMMERCIAL

## 2021-03-29 VITALS
WEIGHT: 162.6 LBS | BODY MASS INDEX: 26.13 KG/M2 | HEART RATE: 76 BPM | RESPIRATION RATE: 16 BRPM | TEMPERATURE: 97.1 F | HEIGHT: 66 IN | OXYGEN SATURATION: 98 % | DIASTOLIC BLOOD PRESSURE: 76 MMHG | SYSTOLIC BLOOD PRESSURE: 138 MMHG

## 2021-03-29 DIAGNOSIS — N52.9 ERECTILE DYSFUNCTION, UNSPECIFIED ERECTILE DYSFUNCTION TYPE: ICD-10-CM

## 2021-03-29 DIAGNOSIS — Z00.00 ANNUAL PHYSICAL EXAM: Primary | ICD-10-CM

## 2021-03-29 DIAGNOSIS — Z12.5 SCREENING FOR PROSTATE CANCER: ICD-10-CM

## 2021-03-29 DIAGNOSIS — I10 BENIGN ESSENTIAL HYPERTENSION: ICD-10-CM

## 2021-03-29 DIAGNOSIS — Z86.19 HISTORY OF HEPATITIS C: ICD-10-CM

## 2021-03-29 DIAGNOSIS — E78.2 HYPERLIPIDEMIA, MIXED: ICD-10-CM

## 2021-03-29 PROCEDURE — 3075F SYST BP GE 130 - 139MM HG: CPT | Performed by: NURSE PRACTITIONER

## 2021-03-29 PROCEDURE — 99396 PREV VISIT EST AGE 40-64: CPT | Performed by: NURSE PRACTITIONER

## 2021-03-29 PROCEDURE — 3078F DIAST BP <80 MM HG: CPT | Performed by: NURSE PRACTITIONER

## 2021-03-29 PROCEDURE — 3008F BODY MASS INDEX DOCD: CPT | Performed by: NURSE PRACTITIONER

## 2021-03-29 NOTE — PROGRESS NOTES
[id number] [address] [place] [address]    NAME: Cheri Valencia  AGE: 61 y o  SEX: male  : 1960     DATE: 3/29/2021     Assessment and Plan:     Problem List Items Addressed This Visit        Digestive    History of hepatitis C       Cardiovascular and Mediastinum    Benign essential hypertension    Relevant Orders    Comprehensive metabolic panel    CBC and differential    Lipid Panel with Direct LDL reflex       Other    Erectile dysfunction    Hyperlipidemia, mixed    Relevant Orders    Comprehensive metabolic panel    BMI 53 7-80 5,YDMEE    Annual physical exam - Primary      Other Visit Diagnoses     Screening for prostate cancer        Relevant Orders    PSA, Total Screen          Immunizations and preventive care screenings were discussed with patient today  Appropriate education was printed on patient's after visit summary  Counseling:  Injury prevention: discussed safety/seat belts, safety helmets, smoke detectors, carbon dioxide detectors, and smoking near bedding or upholstery  Sexual health: discussed sexually transmitted diseases, partner selection, use of condoms, avoidance of unintended pregnancy, and contraceptive alternatives  · Exercise: the importance of regular exercise/physical activity was discussed  Recommend exercise 3-5 times per week for at least 30 minutes  BMI Counseling: Body mass index is 26 24 kg/m²  The BMI is above normal  Nutrition recommendations include decreasing portion sizes, encouraging healthy choices of fruits and vegetables, decreasing fast food intake, consuming healthier snacks, limiting drinks that contain sugar, moderation in carbohydrate intake, increasing intake of lean protein, reducing intake of saturated and trans fat and reducing intake of cholesterol  Exercise recommendations include moderate physical activity 150 minutes/week  Patient referred to PCP due to patient being overweight  Return in 1 year (on 3/29/2022)  Chief Complaint:     Chief Complaint   Patient presents with    Follow-up    Hypertension      History of Present Illness:     Adult Annual Physical   Patient here for a comprehensive physical exam  The patient reports no problems  Diet and Physical Activity  · Diet/Nutrition: well balanced diet, heart healthy (low sodium) diet and 80 % vegetarian   · Exercise: walking and moderate cardiovascular exercise  Depression Screening  PHQ-9 Depression Screening    PHQ-9:   Frequency of the following problems over the past two weeks:           General Health  · Sleep: sleeps well and gets 7-8 hours of sleep on average  · Hearing: normal - bilateral   · Vision: no vision problems  · Dental: regular dental visits and brushes teeth three times daily   Health  · Symptoms include: erectile dysfunction     Review of Systems:     Review of Systems   Constitutional: Negative  HENT: Negative  Eyes: Negative  Respiratory: Negative  Cardiovascular: Negative  Gastrointestinal: Negative  Endocrine: Negative  Genitourinary: Negative  Musculoskeletal: Negative  Skin: Negative  Allergic/Immunologic: Negative  Neurological: Negative  Hematological: Negative  Psychiatric/Behavioral: Negative         Past Medical History:     Past Medical History:   Diagnosis Date    Coronary ostial stenosis     Hypertension       Past Surgical History:     Past Surgical History:   Procedure Laterality Date    CHEST TUBE INSERTION      HAND SURGERY        Family History:     Family History   Problem Relation Age of Onset    Arthritis Mother     Multiple sclerosis Mother     Diabetes Father       Social History:        Social History     Socioeconomic History    Marital status: Single     Spouse name: Not on file    Number of children: Not on file    Years of education: Not on file    Highest education level: Not on file   Occupational History    Not on file   Social Needs    Financial resource strain: Not on file    Food insecurity     Worry: Not on file     Inability: Not on file    Transportation needs     Medical: Not on file     Non-medical: Not on file   Tobacco Use    Smoking status: Never Smoker    Smokeless tobacco: Never Used   Substance and Sexual Activity    Alcohol use: Yes    Drug use: No    Sexual activity: Not on file   Lifestyle    Physical activity     Days per week: Not on file     Minutes per session: Not on file    Stress: Not on file   Relationships    Social connections     Talks on phone: Not on file     Gets together: Not on file     Attends [employer] service: Not on file     Active member of club or organization: Not on file     Attends meetings of clubs or organizations: Not on file     Relationship status: Not on file    Intimate partner violence     Fear of current or ex partner: Not on file     Emotionally abused: Not on file     Physically abused: Not on file     Forced sexual activity: Not on file   Other Topics Concern    Not on file   Social History Narrative    Not on file      Current Medications:     Current Outpatient Medications   Medication Sig Dispense Refill    metoprolol succinate (TOPROL-XL) 25 mg 24 hr tablet Take 1 tablet (25 mg total) by mouth daily 90 tablet 3    olmesartan-hydrochlorothiazide (BENICAR HCT) 40-12 5 MG per tablet Take 1 tablet by mouth daily 90 tablet 3    vardenafil (LEVITRA) 20 MG tablet Take 1 tablet (20 mg total) by mouth daily as needed for erectile dysfunction 10 tablet 0     No current facility-administered medications for this visit  Allergies:      Allergies   Allergen Reactions    Claritin-D 24 Hour  [Loratadine-Pseudoephedrine Er]       Physical Exam:     /76 (BP Location: Left arm, Patient Position: Sitting, Cuff Size: Adult)   Pulse 76   Temp (!) 97 1 °F (36 2 °C)   Resp 16   Ht 5' 6" (1 676 m)   Wt 73 8 kg (162 lb 9 6 oz)   SpO2 98% BMI 26 24 kg/m²     Physical Exam  Vitals signs and nursing note reviewed  Constitutional:       Appearance: He is well-developed  HENT:      Head: Normocephalic and atraumatic  Right Ear: Tympanic membrane and ear canal normal       Left Ear: Tympanic membrane and ear canal normal       Nose: Nose normal       Mouth/Throat:      Mouth: Mucous membranes are moist    Eyes:      Conjunctiva/sclera: Conjunctivae normal    Neck:      Musculoskeletal: Neck supple  Cardiovascular:      Rate and Rhythm: Normal rate and regular rhythm  Heart sounds: No murmur  Pulmonary:      Effort: Pulmonary effort is normal  No respiratory distress  Breath sounds: Normal breath sounds  Abdominal:      Palpations: Abdomen is soft  Tenderness: There is no abdominal tenderness  Musculoskeletal: Normal range of motion  Skin:     General: Skin is warm and dry  Capillary Refill: Capillary refill takes less than 2 seconds  Neurological:      General: No focal deficit present  Mental Status: He is alert and oriented to person, place, and time  Psychiatric:         Mood and Affect: Mood normal          Behavior: Behavior normal          Thought Content:  Thought content normal          Judgment: Judgment normal           Francis Bravo, [address]

## 2021-03-29 NOTE — PATIENT INSTRUCTIONS

## 2021-09-20 DIAGNOSIS — N52.9 ERECTILE DYSFUNCTION, UNSPECIFIED ERECTILE DYSFUNCTION TYPE: ICD-10-CM

## 2021-09-20 RX ORDER — VARDENAFIL HYDROCHLORIDE 20 MG/1
20 TABLET ORAL DAILY PRN
Qty: 10 TABLET | Refills: 0 | Status: SHIPPED | OUTPATIENT
Start: 2021-09-20 | End: 2021-10-19 | Stop reason: ALTCHOICE

## 2021-10-06 DIAGNOSIS — I10 BENIGN ESSENTIAL HYPERTENSION: ICD-10-CM

## 2021-10-06 RX ORDER — OLMESARTAN MEDOXOMIL AND HYDROCHLOROTHIAZIDE 40/12.5 40; 12.5 MG/1; MG/1
TABLET ORAL
Qty: 90 TABLET | Refills: 3 | Status: SHIPPED | OUTPATIENT
Start: 2021-10-06

## 2021-10-15 ENCOUNTER — APPOINTMENT (OUTPATIENT)
Dept: LAB | Facility: CLINIC | Age: 61
End: 2021-10-15
Payer: COMMERCIAL

## 2021-10-15 DIAGNOSIS — E78.2 HYPERLIPIDEMIA, MIXED: ICD-10-CM

## 2021-10-15 DIAGNOSIS — Z12.5 SCREENING FOR PROSTATE CANCER: ICD-10-CM

## 2021-10-15 DIAGNOSIS — I10 BENIGN ESSENTIAL HYPERTENSION: ICD-10-CM

## 2021-10-15 LAB
ALBUMIN SERPL BCP-MCNC: 3.3 G/DL (ref 3.5–5)
ALP SERPL-CCNC: 101 U/L (ref 46–116)
ALT SERPL W P-5'-P-CCNC: 42 U/L (ref 12–78)
ANION GAP SERPL CALCULATED.3IONS-SCNC: 5 MMOL/L (ref 4–13)
AST SERPL W P-5'-P-CCNC: 34 U/L (ref 5–45)
BASOPHILS # BLD AUTO: 0.04 THOUSANDS/ΜL (ref 0–0.1)
BASOPHILS NFR BLD AUTO: 1 % (ref 0–1)
BILIRUB SERPL-MCNC: 0.44 MG/DL (ref 0.2–1)
BUN SERPL-MCNC: 13 MG/DL (ref 5–25)
CALCIUM ALBUM COR SERPL-MCNC: 9.4 MG/DL (ref 8.3–10.1)
CALCIUM SERPL-MCNC: 8.8 MG/DL (ref 8.3–10.1)
CHLORIDE SERPL-SCNC: 105 MMOL/L (ref 100–108)
CHOLEST SERPL-MCNC: 210 MG/DL (ref 50–200)
CO2 SERPL-SCNC: 24 MMOL/L (ref 21–32)
CREAT SERPL-MCNC: 0.86 MG/DL (ref 0.6–1.3)
EOSINOPHIL # BLD AUTO: 0.08 THOUSAND/ΜL (ref 0–0.61)
EOSINOPHIL NFR BLD AUTO: 2 % (ref 0–6)
ERYTHROCYTE [DISTWIDTH] IN BLOOD BY AUTOMATED COUNT: 12.5 % (ref 11.6–15.1)
GFR SERPL CREATININE-BSD FRML MDRD: 94 ML/MIN/1.73SQ M
GLUCOSE P FAST SERPL-MCNC: 90 MG/DL (ref 65–99)
HCT VFR BLD AUTO: 44.5 % (ref 36.5–49.3)
HDLC SERPL-MCNC: 46 MG/DL
HGB BLD-MCNC: 15.1 G/DL (ref 12–17)
IMM GRANULOCYTES # BLD AUTO: 0.01 THOUSAND/UL (ref 0–0.2)
IMM GRANULOCYTES NFR BLD AUTO: 0 % (ref 0–2)
LDLC SERPL DIRECT ASSAY-MCNC: 88 MG/DL (ref 0–100)
LYMPHOCYTES # BLD AUTO: 1.72 THOUSANDS/ΜL (ref 0.6–4.47)
LYMPHOCYTES NFR BLD AUTO: 32 % (ref 14–44)
MCH RBC QN AUTO: 34.6 PG (ref 26.8–34.3)
MCHC RBC AUTO-ENTMCNC: 33.9 G/DL (ref 31.4–37.4)
MCV RBC AUTO: 102 FL (ref 82–98)
MONOCYTES # BLD AUTO: 0.61 THOUSAND/ΜL (ref 0.17–1.22)
MONOCYTES NFR BLD AUTO: 11 % (ref 4–12)
NEUTROPHILS # BLD AUTO: 2.89 THOUSANDS/ΜL (ref 1.85–7.62)
NEUTS SEG NFR BLD AUTO: 54 % (ref 43–75)
NRBC BLD AUTO-RTO: 0 /100 WBCS
PLATELET # BLD AUTO: 240 THOUSANDS/UL (ref 149–390)
PMV BLD AUTO: 8.7 FL (ref 8.9–12.7)
POTASSIUM SERPL-SCNC: 4.3 MMOL/L (ref 3.5–5.3)
PROT SERPL-MCNC: 7.4 G/DL (ref 6.4–8.2)
PSA SERPL-MCNC: 0.4 NG/ML (ref 0–4)
RBC # BLD AUTO: 4.37 MILLION/UL (ref 3.88–5.62)
SODIUM SERPL-SCNC: 134 MMOL/L (ref 136–145)
TRIGL SERPL-MCNC: 506 MG/DL
WBC # BLD AUTO: 5.35 THOUSAND/UL (ref 4.31–10.16)

## 2021-10-15 PROCEDURE — 80061 LIPID PANEL: CPT

## 2021-10-15 PROCEDURE — 80053 COMPREHEN METABOLIC PANEL: CPT

## 2021-10-15 PROCEDURE — 85025 COMPLETE CBC W/AUTO DIFF WBC: CPT

## 2021-10-15 PROCEDURE — 36415 COLL VENOUS BLD VENIPUNCTURE: CPT

## 2021-10-15 PROCEDURE — G0103 PSA SCREENING: HCPCS

## 2021-10-15 PROCEDURE — 83721 ASSAY OF BLOOD LIPOPROTEIN: CPT

## 2021-10-19 ENCOUNTER — OFFICE VISIT (OUTPATIENT)
Dept: FAMILY MEDICINE CLINIC | Facility: CLINIC | Age: 61
End: 2021-10-19
Payer: COMMERCIAL

## 2021-10-19 VITALS
HEIGHT: 66 IN | TEMPERATURE: 98.6 F | DIASTOLIC BLOOD PRESSURE: 88 MMHG | OXYGEN SATURATION: 97 % | WEIGHT: 161 LBS | BODY MASS INDEX: 25.88 KG/M2 | SYSTOLIC BLOOD PRESSURE: 164 MMHG | HEART RATE: 80 BPM

## 2021-10-19 DIAGNOSIS — Z00.00 ANNUAL PHYSICAL EXAM: Primary | ICD-10-CM

## 2021-10-19 DIAGNOSIS — I10 BENIGN ESSENTIAL HYPERTENSION: ICD-10-CM

## 2021-10-19 DIAGNOSIS — E78.2 HYPERLIPIDEMIA, MIXED: ICD-10-CM

## 2021-10-19 DIAGNOSIS — N52.9 ERECTILE DYSFUNCTION, UNSPECIFIED ERECTILE DYSFUNCTION TYPE: ICD-10-CM

## 2021-10-19 PROCEDURE — 99214 OFFICE O/P EST MOD 30 MIN: CPT | Performed by: NURSE PRACTITIONER

## 2021-10-19 PROCEDURE — 3725F SCREEN DEPRESSION PERFORMED: CPT | Performed by: NURSE PRACTITIONER

## 2021-10-19 PROCEDURE — 3077F SYST BP >= 140 MM HG: CPT | Performed by: NURSE PRACTITIONER

## 2021-10-19 PROCEDURE — 3008F BODY MASS INDEX DOCD: CPT | Performed by: NURSE PRACTITIONER

## 2021-10-19 PROCEDURE — 3079F DIAST BP 80-89 MM HG: CPT | Performed by: NURSE PRACTITIONER

## 2021-10-19 RX ORDER — ATORVASTATIN CALCIUM 20 MG/1
20 TABLET, FILM COATED ORAL DAILY
Qty: 90 TABLET | Refills: 3 | Status: SHIPPED | OUTPATIENT
Start: 2021-10-19 | End: 2022-04-19 | Stop reason: ALTCHOICE

## 2021-10-19 RX ORDER — METOPROLOL SUCCINATE 50 MG/1
50 TABLET, EXTENDED RELEASE ORAL DAILY
Qty: 90 TABLET | Refills: 3 | Status: SHIPPED | OUTPATIENT
Start: 2021-10-19 | End: 2022-04-19

## 2021-10-19 RX ORDER — TADALAFIL 10 MG/1
10 TABLET ORAL DAILY PRN
Qty: 30 TABLET | Refills: 0 | Status: SHIPPED | OUTPATIENT
Start: 2021-10-19

## 2022-04-19 ENCOUNTER — OFFICE VISIT (OUTPATIENT)
Dept: FAMILY MEDICINE CLINIC | Facility: CLINIC | Age: 62
End: 2022-04-19
Payer: COMMERCIAL

## 2022-04-19 VITALS
BODY MASS INDEX: 26.84 KG/M2 | WEIGHT: 167 LBS | HEIGHT: 66 IN | DIASTOLIC BLOOD PRESSURE: 76 MMHG | HEART RATE: 74 BPM | SYSTOLIC BLOOD PRESSURE: 128 MMHG | OXYGEN SATURATION: 96 %

## 2022-04-19 DIAGNOSIS — E78.2 HYPERLIPIDEMIA, MIXED: ICD-10-CM

## 2022-04-19 DIAGNOSIS — I10 BENIGN ESSENTIAL HYPERTENSION: ICD-10-CM

## 2022-04-19 DIAGNOSIS — Z00.00 ANNUAL PHYSICAL EXAM: Primary | ICD-10-CM

## 2022-04-19 DIAGNOSIS — N52.9 ERECTILE DYSFUNCTION, UNSPECIFIED ERECTILE DYSFUNCTION TYPE: ICD-10-CM

## 2022-04-19 PROCEDURE — 3078F DIAST BP <80 MM HG: CPT | Performed by: NURSE PRACTITIONER

## 2022-04-19 PROCEDURE — 3074F SYST BP LT 130 MM HG: CPT | Performed by: NURSE PRACTITIONER

## 2022-04-19 PROCEDURE — 3008F BODY MASS INDEX DOCD: CPT | Performed by: NURSE PRACTITIONER

## 2022-04-19 PROCEDURE — 1036F TOBACCO NON-USER: CPT | Performed by: NURSE PRACTITIONER

## 2022-04-19 PROCEDURE — 99396 PREV VISIT EST AGE 40-64: CPT | Performed by: NURSE PRACTITIONER

## 2022-04-19 PROCEDURE — 3725F SCREEN DEPRESSION PERFORMED: CPT | Performed by: NURSE PRACTITIONER

## 2022-04-19 NOTE — PROGRESS NOTES
[address] [address] [place] [address]    NAME: Tressa Valencia  AGE: 64 y o  SEX: male  : 1960     DATE: 2022     Assessment and Plan:     Problem List Items Addressed This Visit        Cardiovascular and Mediastinum    Benign essential hypertension    Relevant Orders    Comprehensive metabolic panel    Lipid Panel with Direct LDL reflex    CBC and differential       Other    Erectile dysfunction    Hyperlipidemia, mixed    Relevant Orders    Comprehensive metabolic panel    Lipid Panel with Direct LDL reflex    CBC and differential    Annual physical exam - Primary    Body mass index (BMI) 26 0-26 9, adult          Immunizations and preventive care screenings were discussed with patient today  Appropriate education was printed on patient's after visit summary  Counseling:  Dental Health: discussed importance of regular tooth brushing, flossing, and dental visits  · Exercise: the importance of regular exercise/physical activity was discussed  Recommend exercise 3-5 times per week for at least 30 minutes  BMI Counseling: Body mass index is 26 95 kg/m²  The BMI is above normal  Nutrition recommendations include decreasing portion sizes, encouraging healthy choices of fruits and vegetables, decreasing fast food intake, consuming healthier snacks, limiting drinks that contain sugar, moderation in carbohydrate intake, increasing intake of lean protein, reducing intake of saturated and trans fat and reducing intake of cholesterol  Exercise recommendations include moderate physical activity 150 minutes/week  No pharmacotherapy was ordered  Patient referred to PCP  Rationale for BMI follow-up plan is due to patient being overweight or obese  Depression Screening and Follow-up Plan: Patient was screened for depression during today's encounter  They screened negative with a PHQ-2 score of 0  Return in 1 year (on 2023)       Chief Complaint:     Chief Complaint   Patient presents with    Annual     no questions or concerns       History of Present Illness:     Adult Annual Physical   Patient here for a comprehensive physical exam  The patient reports no problems  Diet and Physical Activity  · Diet/Nutrition: well balanced diet  · Exercise: no formal exercise  Depression Screening  PHQ-2/9 Depression Screening    Little interest or pleasure in doing things: 0 - not at all  Feeling down, depressed, or hopeless: 0 - not at all  PHQ-2 Score: 0  PHQ-2 Interpretation: Negative depression screen       General Health  · Sleep: sleeps well and gets 7-8 hours of sleep on average  · Hearing: normal - bilateral   · Vision: no vision problems  · Dental: regular dental visits, brushes teeth twice daily and flosses teeth occasionally   Health  · Symptoms include: none     Review of Systems:     Review of Systems   Constitutional: Negative for activity change, appetite change, chills, diaphoresis, fatigue, fever and unexpected weight change  HENT: Negative for congestion, ear pain, hearing loss, postnasal drip, sinus pressure, sinus pain, sneezing, sore throat, tinnitus, trouble swallowing and voice change  Eyes: Negative for pain, redness and visual disturbance  Respiratory: Negative for apnea, cough, choking, chest tightness, shortness of breath, wheezing and stridor  Cardiovascular: Negative for chest pain, palpitations and leg swelling  Gastrointestinal: Negative for abdominal pain, diarrhea, nausea and vomiting  Endocrine: Negative  Genitourinary: Negative  Musculoskeletal: Negative for arthralgias  Skin: Negative  Allergic/Immunologic: Negative  Neurological: Negative for dizziness, light-headedness and headaches  Hematological: Negative  Psychiatric/Behavioral: Negative for behavioral problems and dysphoric mood        Past Medical History:     Past Medical History:   Diagnosis Date    Coronary ostial stenosis     History of hepatitis C 3/19/2013    Hypertension       Past Surgical History:     Past Surgical History:   Procedure Laterality Date    CHEST TUBE INSERTION      HAND SURGERY        Family History:     Family History   Problem Relation Age of Onset    Arthritis Mother     Multiple sclerosis Mother     Diabetes Father       Social History:     Social History     Socioeconomic History    Marital status: Single     Spouse name: Not on file    Number of children: Not on file    Years of education: Not on file    Highest education level: Not on file   Occupational History    Not on file   Tobacco Use    Smoking status: Never Smoker    Smokeless tobacco: Never Used   Substance and Sexual Activity    Alcohol use: Yes    Drug use: No    Sexual activity: Not on file   Other Topics Concern    Not on file   Social History Narrative    Not on file     Social Determinants of Health     Financial Resource Strain: Not on file   Food Insecurity: Not on file   Transportation Needs: Not on file   Physical Activity: Not on file   Stress: Not on file   Social Connections: Not on file   Intimate Partner Violence: Not on file   Housing Stability: Not on file      Current Medications:     Current Outpatient Medications   Medication Sig Dispense Refill    metoprolol succinate (TOPROL-XL) 50 mg 24 hr tablet Take 1 tablet (50 mg total) by mouth daily 90 tablet 3    olmesartan-hydrochlorothiazide (BENICAR HCT) 40-12 5 MG per tablet TAKE 1 TABLET BY MOUTH EVERY DAY 90 tablet 3    tadalafil (CIALIS) 10 MG tablet Take 1 tablet (10 mg total) by mouth daily as needed for erectile dysfunction 30 tablet 0     No current facility-administered medications for this visit  Allergies:      Allergies   Allergen Reactions    Atorvastatin Fatigue    Claritin-D 24 Hour  [Loratadine-Pseudoephedrine Er]       Physical Exam:     /76   Pulse 74   Ht 5' 6" (1 676 m)   Wt 75 8 kg (167 lb)   SpO2 96%   BMI 26 95 kg/m²     Physical Exam  Vitals and nursing note reviewed  Constitutional:       Appearance: He is well-developed  HENT:      Head: Normocephalic and atraumatic  Eyes:      Conjunctiva/sclera: Conjunctivae normal    Cardiovascular:      Rate and Rhythm: Normal rate and regular rhythm  Heart sounds: No murmur heard  Pulmonary:      Effort: Pulmonary effort is normal  No respiratory distress  Breath sounds: Normal breath sounds  Abdominal:      Palpations: Abdomen is soft  Tenderness: There is no abdominal tenderness  Musculoskeletal:      Cervical back: Neck supple  Skin:     General: Skin is warm and dry  Neurological:      Mental Status: He is alert            Ely Pinks, [address]

## 2022-04-19 NOTE — PATIENT INSTRUCTIONS

## 2022-10-02 DIAGNOSIS — I10 BENIGN ESSENTIAL HYPERTENSION: ICD-10-CM

## 2022-10-03 RX ORDER — METOPROLOL SUCCINATE 50 MG/1
TABLET, EXTENDED RELEASE ORAL
Qty: 90 TABLET | Refills: 3 | Status: SHIPPED | OUTPATIENT
Start: 2022-10-03

## 2022-10-25 ENCOUNTER — OFFICE VISIT (OUTPATIENT)
Dept: FAMILY MEDICINE CLINIC | Facility: CLINIC | Age: 62
End: 2022-10-25
Payer: COMMERCIAL

## 2022-10-25 VITALS
OXYGEN SATURATION: 97 % | SYSTOLIC BLOOD PRESSURE: 122 MMHG | WEIGHT: 169 LBS | BODY MASS INDEX: 27.16 KG/M2 | DIASTOLIC BLOOD PRESSURE: 70 MMHG | HEART RATE: 89 BPM | HEIGHT: 66 IN

## 2022-10-25 DIAGNOSIS — E78.2 HYPERLIPIDEMIA, MIXED: ICD-10-CM

## 2022-10-25 DIAGNOSIS — N52.9 ERECTILE DYSFUNCTION, UNSPECIFIED ERECTILE DYSFUNCTION TYPE: ICD-10-CM

## 2022-10-25 DIAGNOSIS — I10 BENIGN ESSENTIAL HYPERTENSION: Primary | ICD-10-CM

## 2022-10-25 PROBLEM — Z00.00 ANNUAL PHYSICAL EXAM: Status: RESOLVED | Noted: 2022-04-19 | Resolved: 2022-10-25

## 2022-10-25 PROCEDURE — 99213 OFFICE O/P EST LOW 20 MIN: CPT | Performed by: NURSE PRACTITIONER

## 2022-10-25 NOTE — PROGRESS NOTES
Depression Screening and Follow-up Plan: Patient was screened for depression during today's encounter  They screened negative with a PHQ-2 score of 0  Assessment/Plan:         Problem List Items Addressed This Visit        Cardiovascular and Mediastinum    Benign essential hypertension - Primary     Patient BP is well controlled on Toprol/Benicar          Relevant Orders    Comprehensive metabolic panel    CBC and differential       Other    Erectile dysfunction     On the Cialis          Relevant Orders    PSA, Total Screen    Hyperlipidemia, mixed     Lipid panel ordered          Relevant Orders    Lipid Panel with Direct LDL reflex    Body mass index (BMI) 26 0-26 9, adult            Subjective:      Patient ID: Kevyn Hayden is a 58 y o  male  Patient here today for his check up he has no present complaints       The following portions of the patient's history were reviewed and updated as appropriate:   He  has a past medical history of Coronary ostial stenosis, History of hepatitis C (3/19/2013), and Hypertension  He   Patient Active Problem List    Diagnosis Date Noted   • Body mass index (BMI) 26 0-26 9, adult 04/19/2022   • Hyperlipidemia, mixed 05/03/2018   • Benign essential hypertension 06/23/2014   • Erectile dysfunction 08/31/2012     He  has a past surgical history that includes Chest tube insertion and Hand surgery  His family history includes Arthritis in his mother; Diabetes in his father; Multiple sclerosis in his mother  He  reports that he has never smoked  He has never used smokeless tobacco  He reports current alcohol use  He reports that he does not use drugs    Current Outpatient Medications   Medication Sig Dispense Refill   • metoprolol succinate (TOPROL-XL) 50 mg 24 hr tablet TAKE 1 TABLET BY MOUTH EVERY DAY 90 tablet 3   • olmesartan-hydrochlorothiazide (BENICAR HCT) 40-12 5 MG per tablet TAKE 1 TABLET BY MOUTH EVERY DAY 90 tablet 1   • tadalafil (CIALIS) 10 MG tablet Take 1 tablet (10 mg total) by mouth daily as needed for erectile dysfunction 30 tablet 0     No current facility-administered medications for this visit  He is allergic to atorvastatin and claritin-d 24 hour  [loratadine-pseudoephedrine er]       Review of Systems   Constitutional: Negative for activity change, appetite change, chills, diaphoresis, fatigue, fever and unexpected weight change  HENT: Negative for congestion, ear pain, hearing loss, postnasal drip, sinus pressure, sinus pain, sneezing and sore throat  Eyes: Negative for pain, redness and visual disturbance  Respiratory: Negative for cough and shortness of breath  Cardiovascular: Negative for chest pain and leg swelling  Gastrointestinal: Negative for abdominal pain, diarrhea, nausea and vomiting  Endocrine: Negative  Genitourinary: Negative  Musculoskeletal: Negative for arthralgias  Allergic/Immunologic: Negative  Neurological: Negative for dizziness and light-headedness  Hematological: Negative  Psychiatric/Behavioral: Negative for behavioral problems and dysphoric mood  Objective:      /70   Pulse 89   Ht 5' 6" (1 676 m)   Wt 76 7 kg (169 lb)   SpO2 97%   BMI 27 28 kg/m²          Physical Exam  Vitals and nursing note reviewed  Constitutional:       General: He is not in acute distress  Appearance: He is well-developed  HENT:      Head: Normocephalic and atraumatic  Right Ear: Tympanic membrane normal       Left Ear: Tympanic membrane normal       Nose: Nose normal       Mouth/Throat:      Mouth: Mucous membranes are moist    Eyes:      Pupils: Pupils are equal, round, and reactive to light  Neck:      Thyroid: No thyromegaly  Cardiovascular:      Rate and Rhythm: Normal rate and regular rhythm  Heart sounds: Normal heart sounds  No murmur heard  Pulmonary:      Effort: Pulmonary effort is normal  No respiratory distress  Breath sounds: Normal breath sounds  No wheezing  Abdominal:      General: Bowel sounds are normal       Palpations: Abdomen is soft  Musculoskeletal:         General: Normal range of motion  Cervical back: Normal range of motion  Skin:     General: Skin is warm and dry  Neurological:      General: No focal deficit present  Mental Status: He is alert and oriented to person, place, and time  Psychiatric:         Mood and Affect: Mood normal          Behavior: Behavior normal          Thought Content:  Thought content normal          Judgment: Judgment normal

## 2022-11-04 ENCOUNTER — APPOINTMENT (OUTPATIENT)
Dept: LAB | Facility: CLINIC | Age: 62
End: 2022-11-04

## 2022-11-04 DIAGNOSIS — I10 BENIGN ESSENTIAL HYPERTENSION: ICD-10-CM

## 2022-11-04 DIAGNOSIS — E78.2 HYPERLIPIDEMIA, MIXED: ICD-10-CM

## 2022-11-04 DIAGNOSIS — N52.9 ERECTILE DYSFUNCTION, UNSPECIFIED ERECTILE DYSFUNCTION TYPE: ICD-10-CM

## 2022-11-04 LAB
ALBUMIN SERPL BCP-MCNC: 3.3 G/DL (ref 3.5–5)
ALP SERPL-CCNC: 84 U/L (ref 46–116)
ALT SERPL W P-5'-P-CCNC: 46 U/L (ref 12–78)
ANION GAP SERPL CALCULATED.3IONS-SCNC: 8 MMOL/L (ref 4–13)
AST SERPL W P-5'-P-CCNC: 40 U/L (ref 5–45)
BASOPHILS # BLD AUTO: 0.03 THOUSANDS/ÂΜL (ref 0–0.1)
BASOPHILS NFR BLD AUTO: 1 % (ref 0–1)
BILIRUB SERPL-MCNC: 0.5 MG/DL (ref 0.2–1)
BUN SERPL-MCNC: 20 MG/DL (ref 5–25)
CALCIUM ALBUM COR SERPL-MCNC: 9.5 MG/DL (ref 8.3–10.1)
CALCIUM SERPL-MCNC: 8.9 MG/DL (ref 8.3–10.1)
CHLORIDE SERPL-SCNC: 103 MMOL/L (ref 96–108)
CHOLEST SERPL-MCNC: 218 MG/DL
CO2 SERPL-SCNC: 24 MMOL/L (ref 21–32)
CREAT SERPL-MCNC: 0.88 MG/DL (ref 0.6–1.3)
EOSINOPHIL # BLD AUTO: 0.1 THOUSAND/ÂΜL (ref 0–0.61)
EOSINOPHIL NFR BLD AUTO: 2 % (ref 0–6)
ERYTHROCYTE [DISTWIDTH] IN BLOOD BY AUTOMATED COUNT: 11.6 % (ref 11.6–15.1)
GFR SERPL CREATININE-BSD FRML MDRD: 92 ML/MIN/1.73SQ M
GLUCOSE P FAST SERPL-MCNC: 84 MG/DL (ref 65–99)
HCT VFR BLD AUTO: 42.3 % (ref 36.5–49.3)
HDLC SERPL-MCNC: 50 MG/DL
HGB BLD-MCNC: 14.5 G/DL (ref 12–17)
IMM GRANULOCYTES # BLD AUTO: 0.03 THOUSAND/UL (ref 0–0.2)
IMM GRANULOCYTES NFR BLD AUTO: 1 % (ref 0–2)
LDLC SERPL DIRECT ASSAY-MCNC: 108 MG/DL (ref 0–100)
LYMPHOCYTES # BLD AUTO: 2.11 THOUSANDS/ÂΜL (ref 0.6–4.47)
LYMPHOCYTES NFR BLD AUTO: 32 % (ref 14–44)
MCH RBC QN AUTO: 34.7 PG (ref 26.8–34.3)
MCHC RBC AUTO-ENTMCNC: 34.3 G/DL (ref 31.4–37.4)
MCV RBC AUTO: 101 FL (ref 82–98)
MONOCYTES # BLD AUTO: 0.74 THOUSAND/ÂΜL (ref 0.17–1.22)
MONOCYTES NFR BLD AUTO: 11 % (ref 4–12)
NEUTROPHILS # BLD AUTO: 3.63 THOUSANDS/ÂΜL (ref 1.85–7.62)
NEUTS SEG NFR BLD AUTO: 53 % (ref 43–75)
NRBC BLD AUTO-RTO: 0 /100 WBCS
PLATELET # BLD AUTO: 241 THOUSANDS/UL (ref 149–390)
PMV BLD AUTO: 9.5 FL (ref 8.9–12.7)
POTASSIUM SERPL-SCNC: 4.3 MMOL/L (ref 3.5–5.3)
PROT SERPL-MCNC: 7.9 G/DL (ref 6.4–8.4)
PSA SERPL-MCNC: 0.4 NG/ML (ref 0–4)
RBC # BLD AUTO: 4.18 MILLION/UL (ref 3.88–5.62)
SODIUM SERPL-SCNC: 135 MMOL/L (ref 135–147)
TRIGL SERPL-MCNC: 472 MG/DL
WBC # BLD AUTO: 6.64 THOUSAND/UL (ref 4.31–10.16)

## 2023-03-06 DIAGNOSIS — I10 BENIGN ESSENTIAL HYPERTENSION: ICD-10-CM

## 2023-03-06 RX ORDER — OLMESARTAN MEDOXOMIL AND HYDROCHLOROTHIAZIDE 40/12.5 40; 12.5 MG/1; MG/1
TABLET ORAL
Qty: 30 TABLET | Refills: 5 | Status: SHIPPED | OUTPATIENT
Start: 2023-03-06

## 2023-04-02 DIAGNOSIS — I10 BENIGN ESSENTIAL HYPERTENSION: ICD-10-CM

## 2023-04-03 RX ORDER — OLMESARTAN MEDOXOMIL AND HYDROCHLOROTHIAZIDE 40/12.5 40; 12.5 MG/1; MG/1
TABLET ORAL
Qty: 90 TABLET | Refills: 2 | Status: SHIPPED | OUTPATIENT
Start: 2023-04-03

## 2023-04-25 ENCOUNTER — OFFICE VISIT (OUTPATIENT)
Dept: FAMILY MEDICINE CLINIC | Facility: CLINIC | Age: 63
End: 2023-04-25

## 2023-04-25 VITALS
OXYGEN SATURATION: 95 % | BODY MASS INDEX: 26.36 KG/M2 | HEART RATE: 89 BPM | SYSTOLIC BLOOD PRESSURE: 120 MMHG | HEIGHT: 66 IN | DIASTOLIC BLOOD PRESSURE: 72 MMHG | WEIGHT: 164 LBS

## 2023-04-25 DIAGNOSIS — Z00.00 ANNUAL PHYSICAL EXAM: Primary | ICD-10-CM

## 2023-04-25 DIAGNOSIS — N52.9 ERECTILE DYSFUNCTION, UNSPECIFIED ERECTILE DYSFUNCTION TYPE: ICD-10-CM

## 2023-04-25 DIAGNOSIS — Z12.5 SCREENING FOR PROSTATE CANCER: ICD-10-CM

## 2023-04-25 DIAGNOSIS — E78.2 HYPERLIPIDEMIA, MIXED: ICD-10-CM

## 2023-04-25 DIAGNOSIS — I10 BENIGN ESSENTIAL HYPERTENSION: ICD-10-CM

## 2023-04-25 RX ORDER — TADALAFIL 20 MG/1
20 TABLET ORAL DAILY PRN
Qty: 10 TABLET | Refills: 6 | Status: SHIPPED | OUTPATIENT
Start: 2023-04-25 | End: 2023-05-25

## 2023-04-25 NOTE — PROGRESS NOTES
[id number] [address] [place] [address]    NAME: Fuentes Valencia  AGE: 58 y o  SEX: male  : 1960     DATE: 2023     Assessment and Plan:     Problem List Items Addressed This Visit        Cardiovascular and Mediastinum    Benign essential hypertension    Relevant Orders    Comprehensive metabolic panel    CBC and differential    Lipid Panel with Direct LDL reflex       Other    Erectile dysfunction    Relevant Medications    tadalafil (CIALIS) 20 MG tablet    Hyperlipidemia, mixed    Relevant Orders    Comprehensive metabolic panel    CBC and differential    Lipid Panel with Direct LDL reflex    Body mass index (BMI) 26 0-26 9, adult    Annual physical exam - Primary   Other Visit Diagnoses     Screening for prostate cancer        Relevant Orders    PSA, Total Screen          Immunizations and preventive care screenings were discussed with patient today  Appropriate education was printed on patient's after visit summary  Discussed risks and benefits of prostate cancer screening  We discussed the controversial history of PSA screening for prostate cancer in the [place] as well as the risk of over detection and over treatment of prostate cancer by way of PSA screening  The patient understands that PSA blood testing is an imperfect way to screen for prostate cancer and that elevated PSA levels in the blood may also be caused by infection, inflammation, prostatic trauma or manipulation, urological procedures, or by benign prostatic enlargement  The role of the digital rectal examination in prostate cancer screening was also discussed and I discussed with him that there is large interobserver variability in the findings of digital rectal examination  Counseling:  Dental Health: discussed importance of regular tooth brushing, flossing, and dental visits  Exercise: the importance of regular exercise/physical activity was discussed   Recommend exercise 3-5 times per week for at least 30 minutes  BMI Counseling: Body mass index is 26 47 kg/m²  The BMI is above normal  Nutrition recommendations include decreasing portion sizes, encouraging healthy choices of fruits and vegetables, decreasing fast food intake, consuming healthier snacks, limiting drinks that contain sugar, moderation in carbohydrate intake, increasing intake of lean protein, reducing intake of saturated and trans fat and reducing intake of cholesterol  Exercise recommendations include moderate physical activity 150 minutes/week  No pharmacotherapy was ordered  Patient referred to PCP  Rationale for BMI follow-up plan is due to patient being overweight or obese  Return in 1 year (on 4/25/2024)  Chief Complaint:     Chief Complaint   Patient presents with   • Follow-up      History of Present Illness:     Adult Annual Physical   Patient here for a comprehensive physical exam  The patient reports no problems  Diet and Physical Activity  Diet/Nutrition: well balanced diet  Exercise: walking  Depression Screening  PHQ-2/9 Depression Screening         General Health  Sleep: sleeps well  Hearing: normal - bilateral   Vision: no vision problems  Dental: regular dental visits and brushes teeth twice daily   Health  Symptoms include: erectile dysfunction     Review of Systems:     Review of Systems   Constitutional: Negative for activity change, appetite change, chills, diaphoresis, fatigue, fever and unexpected weight change  HENT: Negative for congestion, ear pain, hearing loss, postnasal drip, sinus pressure, sinus pain, sneezing and sore throat  Eyes: Negative for pain, redness and visual disturbance  Respiratory: Negative for cough and shortness of breath  Cardiovascular: Negative for chest pain and leg swelling  Gastrointestinal: Negative for abdominal pain, diarrhea, nausea and vomiting  Endocrine: Negative  Genitourinary: Negative  Musculoskeletal: Negative for arthralgias  Allergic/Immunologic: Negative for environmental allergies  Neurological: Negative for dizziness and light-headedness  Hematological: Negative  Psychiatric/Behavioral: Negative for behavioral problems and dysphoric mood  Past Medical History:     Past Medical History:   Diagnosis Date   • Coronary ostial stenosis    • History of hepatitis C 3/19/2013   • Hypertension       Past Surgical History:     Past Surgical History:   Procedure Laterality Date   • CHEST TUBE INSERTION     • HAND SURGERY        Family History:     Family History   Problem Relation Age of Onset   • Arthritis Mother    • Multiple sclerosis Mother    • Diabetes Father       Social History:     Social History     Socioeconomic History   • Marital status: Single     Spouse name: Not on file   • Number of children: Not on file   • Years of education: Not on file   • Highest education level: Not on file   Occupational History   • Not on file   Tobacco Use   • Smoking status: Never   • Smokeless tobacco: Never   Substance and Sexual Activity   • Alcohol use:  Yes   • Drug use: No   • Sexual activity: Not on file   Other Topics Concern   • Not on file   Social History Narrative   • Not on file     Social Determinants of Health     Financial Resource Strain: Not on file   Food Insecurity: Not on file   Transportation Needs: Not on file   Physical Activity: Not on file   Stress: Not on file   Social Connections: Not on file   Intimate Partner Violence: Not on file   Housing Stability: Not on file      Current Medications:     Current Outpatient Medications   Medication Sig Dispense Refill   • tadalafil (CIALIS) 20 MG tablet Take 1 tablet (20 mg total) by mouth daily as needed for erectile dysfunction 10 tablet 6   • metoprolol succinate (TOPROL-XL) 50 mg 24 hr tablet TAKE 1 TABLET BY MOUTH EVERY DAY 90 tablet 3   • olmesartan-hydrochlorothiazide (BENICAR HCT) 40-12 5 MG per tablet TAKE 1 TABLET BY "MOUTH EVERY DAY 90 tablet 2     No current facility-administered medications for this visit  Allergies: Allergies   Allergen Reactions   • Atorvastatin Fatigue   • Claritin-D 24 Hour  [Loratadine-Pseudoephedrine Er]       Physical Exam:     /72   Pulse 89   Ht 5' 6\" (1 676 m)   Wt 74 4 kg (164 lb)   SpO2 95%   BMI 26 47 kg/m²     Physical Exam  Vitals and nursing note reviewed  Constitutional:       General: He is not in acute distress  Appearance: He is well-developed  HENT:      Head: Normocephalic and atraumatic  Right Ear: Tympanic membrane normal       Left Ear: Tympanic membrane normal       Nose: Nose normal       Mouth/Throat:      Mouth: Mucous membranes are moist    Eyes:      Conjunctiva/sclera: Conjunctivae normal    Cardiovascular:      Rate and Rhythm: Normal rate and regular rhythm  Heart sounds: No murmur heard  Pulmonary:      Effort: Pulmonary effort is normal  No respiratory distress  Breath sounds: Normal breath sounds  Abdominal:      Palpations: Abdomen is soft  Tenderness: There is no abdominal tenderness  Musculoskeletal:         General: No swelling  Cervical back: Neck supple  Skin:     General: Skin is warm and dry  Capillary Refill: Capillary refill takes less than 2 seconds  Neurological:      Mental Status: He is alert     Psychiatric:         Mood and Affect: Mood normal           STAHISH Coffman  [facility]  "

## 2023-08-19 ENCOUNTER — NURSE TRIAGE (OUTPATIENT)
Dept: OTHER | Facility: OTHER | Age: 63
End: 2023-08-19

## 2023-08-19 DIAGNOSIS — I10 BENIGN ESSENTIAL HYPERTENSION: Primary | ICD-10-CM

## 2023-08-19 RX ORDER — METOPROLOL SUCCINATE 50 MG/1
50 TABLET, EXTENDED RELEASE ORAL DAILY
Qty: 7 TABLET | Refills: 0 | Status: SHIPPED | OUTPATIENT
Start: 2023-08-19

## 2023-08-19 RX ORDER — OLMESARTAN MEDOXOMIL AND HYDROCHLOROTHIAZIDE 40/12.5 40; 12.5 MG/1; MG/1
1 TABLET ORAL DAILY
Qty: 7 TABLET | Refills: 0 | Status: SHIPPED | OUTPATIENT
Start: 2023-08-19

## 2023-08-19 NOTE — TELEPHONE ENCOUNTER
Reason for Disposition  • [1] Caller requesting a prescription renewal (no refills left), no triage required, AND [2] triager able to renew prescription per department policy    Answer Assessment - Initial Assessment Questions  1. DRUG NAME: "What medicine do you need to have refilled?"      Metoprolol 50 mg 24 hr tablet- take once daily          olmesartan-HCTZ 40-12.5 mg- take once daily    2. REFILLS REMAINING: "How many refills are remaining?" (Note: The label on the medicine or pill bottle will show how many refills are remaining. If there are no refills remaining, then a renewal may be needed.)      None    3. EXPIRATION DATE: "What is the expiration date?" (Note: The label states when the prescription will , and thus can no longer be refilled.)      N/a    4. PRESCRIBING HCP: "Who prescribed it?" Reason: If prescribed by specialist, call should be referred to that group. Nita Contreras    Protocols used: MEDICATION REFILL AND RENEWAL CALL-ADULT-      Per standing order protocol able to send in 7 day temporary Rx.

## 2023-08-19 NOTE — TELEPHONE ENCOUNTER
Regarding: Med refill  ----- Message from Anjana Orantes sent at 8/19/2023 11:46 AM EDT -----  Medication Refill Request     Name Metoprolol   Dose/Frequency 50mg daily   Quantity 90  Verified pharmacy   YES  Verified ordering Provider   YES  Does patient have enough for the next 3 days? NO    Medication Refill Request     Name  olmesartan-hydrochlorothiazide   Dose/Frequency 40-12.5 MG daily  Quantity 90  Verified pharmacy   YES  Verified ordering Provider   YES  Does patient have enough for the next 3 days? NO    [id number] [place], [place] - [address] [address] [address]   Phone:  [phone]  Fax:  [phone]   ED #:  [id number]

## 2023-09-11 DIAGNOSIS — I10 BENIGN ESSENTIAL HYPERTENSION: ICD-10-CM

## 2023-09-11 RX ORDER — OLMESARTAN MEDOXOMIL AND HYDROCHLOROTHIAZIDE 40/12.5 40; 12.5 MG/1; MG/1
TABLET ORAL
Qty: 90 TABLET | Refills: 3 | Status: SHIPPED | OUTPATIENT
Start: 2023-09-11 | End: 2023-09-18

## 2023-09-18 DIAGNOSIS — I10 BENIGN ESSENTIAL HYPERTENSION: ICD-10-CM

## 2023-09-18 RX ORDER — OLMESARTAN MEDOXOMIL AND HYDROCHLOROTHIAZIDE 40/12.5 40; 12.5 MG/1; MG/1
1 TABLET ORAL DAILY
Qty: 90 TABLET | Refills: 1 | Status: SHIPPED | OUTPATIENT
Start: 2023-09-18

## 2023-10-15 DIAGNOSIS — I10 BENIGN ESSENTIAL HYPERTENSION: ICD-10-CM

## 2023-10-16 RX ORDER — METOPROLOL SUCCINATE 50 MG/1
TABLET, EXTENDED RELEASE ORAL
Qty: 30 TABLET | Refills: 11 | Status: SHIPPED | OUTPATIENT
Start: 2023-10-16

## 2023-10-30 ENCOUNTER — OFFICE VISIT (OUTPATIENT)
Dept: FAMILY MEDICINE CLINIC | Facility: CLINIC | Age: 63
End: 2023-10-30
Payer: COMMERCIAL

## 2023-10-30 VITALS
TEMPERATURE: 98.6 F | OXYGEN SATURATION: 96 % | WEIGHT: 169 LBS | HEIGHT: 66 IN | BODY MASS INDEX: 27.16 KG/M2 | HEART RATE: 73 BPM | SYSTOLIC BLOOD PRESSURE: 134 MMHG | DIASTOLIC BLOOD PRESSURE: 78 MMHG

## 2023-10-30 DIAGNOSIS — Z12.5 SCREENING FOR PROSTATE CANCER: ICD-10-CM

## 2023-10-30 DIAGNOSIS — E78.2 HYPERLIPIDEMIA, MIXED: ICD-10-CM

## 2023-10-30 DIAGNOSIS — N52.9 ERECTILE DYSFUNCTION, UNSPECIFIED ERECTILE DYSFUNCTION TYPE: ICD-10-CM

## 2023-10-30 DIAGNOSIS — I10 BENIGN ESSENTIAL HYPERTENSION: Primary | ICD-10-CM

## 2023-10-30 PROBLEM — Z00.00 ANNUAL PHYSICAL EXAM: Status: RESOLVED | Noted: 2023-04-25 | Resolved: 2023-10-30

## 2023-10-30 PROCEDURE — 3725F SCREEN DEPRESSION PERFORMED: CPT | Performed by: NURSE PRACTITIONER

## 2023-10-30 PROCEDURE — 99214 OFFICE O/P EST MOD 30 MIN: CPT | Performed by: NURSE PRACTITIONER

## 2023-10-30 RX ORDER — OLMESARTAN MEDOXOMIL AND HYDROCHLOROTHIAZIDE 40/12.5 40; 12.5 MG/1; MG/1
1 TABLET ORAL DAILY
Qty: 90 TABLET | Refills: 3 | Status: SHIPPED | OUTPATIENT
Start: 2023-10-30

## 2023-10-30 RX ORDER — METOPROLOL SUCCINATE 50 MG/1
50 TABLET, EXTENDED RELEASE ORAL DAILY
Qty: 90 TABLET | Refills: 3 | Status: SHIPPED | OUTPATIENT
Start: 2023-10-30 | End: 2024-10-29

## 2023-10-30 NOTE — PROGRESS NOTES
Name: Miesha Olson      : 1960      MRN: [id number]  Encounter Provider: SATHISH Guerra  Encounter Date: 10/30/2023   Encounter department: [address] [place] [address]     1. Benign essential hypertension  Assessment & Plan:  Taking the Benicar 40-12.5 mg Toprol 50 mg    Orders:  -     Comprehensive metabolic panel; Future  -     CBC and differential; Future  -     olmesartan-hydrochlorothiazide (BENICAR HCT) 40-12.5 MG per tablet; Take 1 tablet by mouth daily  -     metoprolol succinate (TOPROL-XL) 50 mg 24 hr tablet; Take 1 tablet (50 mg total) by mouth daily    2. Body mass index (BMI) 26.0-26.9, adult    3. Erectile dysfunction, unspecified erectile dysfunction type  Assessment & Plan:  Taking the Cialis prn and effective       4. Hyperlipidemia, mixed  -     Comprehensive metabolic panel; Future  -     Lipid Panel with Direct LDL reflex; Future    5. Screening for prostate cancer  -     PSA, Total Screen; Future      Depression Screening and Follow-up Plan: Patient was screened for depression during today's encounter. They screened negative with a PHQ-2 score of 0. Subjective      Patient here today for his check up and has no acute issues       Review of Systems   Constitutional:  Negative for activity change, appetite change, chills, diaphoresis, fatigue, fever and unexpected weight change. HENT:  Negative for congestion, ear pain, hearing loss, postnasal drip, sinus pressure, sinus pain, sneezing and sore throat. Eyes:  Negative for pain, redness and visual disturbance. Respiratory:  Negative for cough and shortness of breath. Cardiovascular:  Negative for chest pain and leg swelling. Gastrointestinal:  Negative for abdominal pain, diarrhea, nausea and vomiting. Endocrine: Negative. Genitourinary: Negative. Musculoskeletal:  Negative for arthralgias. Skin: Negative. Neurological:  Negative for dizziness and light-headedness. Hematological: Negative. Psychiatric/Behavioral:  Negative for behavioral problems and dysphoric mood. Current Outpatient Medications on File Prior to Visit   Medication Sig   • tadalafil (CIALIS) 20 MG tablet Take 1 tablet (20 mg total) by mouth daily as needed for erectile dysfunction   • [DISCONTINUED] metoprolol succinate (TOPROL-XL) 50 mg 24 hr tablet Take 1 tablet (50 mg total) by mouth daily   • [DISCONTINUED] metoprolol succinate (TOPROL-XL) 50 mg 24 hr tablet TAKE 1 TABLET BY MOUTH EVERY DAY   • [DISCONTINUED] olmesartan-hydrochlorothiazide (BENICAR HCT) 40-12.5 MG per tablet Take 1 tablet by mouth daily       Objective     /78 (BP Location: Left arm, Patient Position: Sitting, Cuff Size: Large)   Pulse 73   Temp 98.6 °F (37 °C)   Ht 5' 6" (1.676 m)   Wt 76.7 kg (169 lb)   SpO2 96%   BMI 27.28 kg/m²     Physical Exam  Vitals and nursing note reviewed. Constitutional:       General: He is not in acute distress. Appearance: He is well-developed. HENT:      Head: Normocephalic and atraumatic. Right Ear: Tympanic membrane normal.      Left Ear: Tympanic membrane normal.      Nose: Nose normal.      Mouth/Throat:      Mouth: Mucous membranes are moist.   Eyes:      Pupils: Pupils are equal, round, and reactive to light. Neck:      Thyroid: No thyromegaly. Cardiovascular:      Rate and Rhythm: Normal rate and regular rhythm. Heart sounds: Normal heart sounds. No murmur heard. Pulmonary:      Effort: Pulmonary effort is normal. No respiratory distress. Breath sounds: Normal breath sounds. No wheezing. Abdominal:      General: Bowel sounds are normal.      Palpations: Abdomen is soft. Musculoskeletal:         General: Normal range of motion. Cervical back: Normal range of motion. Skin:     General: Skin is warm and dry. Neurological:      General: No focal deficit present. Mental Status: He is alert and oriented to person, place, and time. Jaime Rangel

## 2024-05-06 ENCOUNTER — OFFICE VISIT (OUTPATIENT)
Dept: FAMILY MEDICINE CLINIC | Facility: CLINIC | Age: 64
End: 2024-05-06

## 2024-05-06 VITALS
BODY MASS INDEX: 26.84 KG/M2 | DIASTOLIC BLOOD PRESSURE: 76 MMHG | HEIGHT: 66 IN | SYSTOLIC BLOOD PRESSURE: 128 MMHG | HEART RATE: 72 BPM | TEMPERATURE: 99.1 F | WEIGHT: 167 LBS | OXYGEN SATURATION: 100 %

## 2024-05-06 DIAGNOSIS — E78.2 HYPERLIPIDEMIA, MIXED: ICD-10-CM

## 2024-05-06 DIAGNOSIS — Z00.00 ANNUAL PHYSICAL EXAM: Primary | ICD-10-CM

## 2024-05-06 DIAGNOSIS — I10 BENIGN ESSENTIAL HYPERTENSION: ICD-10-CM

## 2024-05-06 DIAGNOSIS — N52.9 ERECTILE DYSFUNCTION, UNSPECIFIED ERECTILE DYSFUNCTION TYPE: ICD-10-CM

## 2024-05-06 PROCEDURE — 99396 PREV VISIT EST AGE 40-64: CPT | Performed by: NURSE PRACTITIONER

## 2024-05-06 RX ORDER — METOPROLOL SUCCINATE 50 MG/1
50 TABLET, EXTENDED RELEASE ORAL DAILY
Qty: 90 TABLET | Refills: 3 | Status: SHIPPED | OUTPATIENT
Start: 2024-05-06 | End: 2025-05-06

## 2024-05-06 RX ORDER — OLMESARTAN MEDOXOMIL AND HYDROCHLOROTHIAZIDE 40/12.5 40; 12.5 MG/1; MG/1
1 TABLET ORAL DAILY
Qty: 90 TABLET | Refills: 3 | Status: SHIPPED | OUTPATIENT
Start: 2024-05-06

## 2024-05-06 NOTE — PROGRESS NOTES
ADULT ANNUAL PHYSICAL  [facility]    NAME: Lita Valencia  AGE: 63 y.o. SEX: male  : 1960     DATE: 2024     Assessment and Plan:     Problem List Items Addressed This Visit        Cardiovascular and Mediastinum    Benign essential hypertension     Well controlled blood pressure          Relevant Medications    olmesartan-hydrochlorothiazide (BENICAR HCT) 40-12.5 MG per tablet    metoprolol succinate (TOPROL-XL) 50 mg 24 hr tablet       Other    Erectile dysfunction    Hyperlipidemia, mixed     Well controlled lipid panel ordered         Body mass index (BMI) 26.0-26.9, adult    Annual physical exam - Primary       Immunizations and preventive care screenings were discussed with patient today. Appropriate education was printed on patient's after visit summary.    Discussed risks and benefits of prostate cancer screening. We discussed the controversial history of PSA screening for prostate cancer in the [place] as well as the risk of over detection and over treatment of prostate cancer by way of PSA screening.  The patient understands that PSA blood testing is an imperfect way to screen for prostate cancer and that elevated PSA levels in the blood may also be caused by infection, inflammation, prostatic trauma or manipulation, urological procedures, or by benign prostatic enlargement.    The role of the digital rectal examination in prostate cancer screening was also discussed and I discussed with him that there is large interobserver variability in the findings of digital rectal examination.    Counseling:  Injury prevention: discussed safety/seat belts, safety helmets, smoke detectors, carbon dioxide detectors, and smoking near bedding or upholstery.  Exercise: the importance of regular exercise/physical activity was discussed. Recommend exercise 3-5 times per week for at least 30 minutes.       Tobacco Cessation Counseling: Tobacco  cessation counseling was provided. The patient is sincerely urged to quit consumption of tobacco. He is not ready to quit tobacco.         No follow-ups on file.     Chief Complaint:     Chief Complaint   Patient presents with   • Follow-up      History of Present Illness:     Adult Annual Physical   Patient here for a comprehensive physical exam. The patient reports no problems.  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   Exercise: walking.      Depression Screening  PHQ-2/9 Depression Screening         General Health  Sleep: sleeps well.   Hearing: normal - bilateral.  Vision: no vision problems.   Dental: regular dental visits and brushes teeth twice daily.        Health  Symptoms include: none    Advanced Care Planning  Do you have an advanced directive? no  Do you have a durable medical power of ? no  ACP document given to patient? yes     Review of Systems:     Review of Systems   Constitutional:  Negative for activity change, appetite change, chills, diaphoresis, fatigue, fever and unexpected weight change.   HENT:  Negative for congestion, ear pain, hearing loss, postnasal drip, sinus pressure, sinus pain, sneezing and sore throat.    Eyes:  Negative for pain, redness and visual disturbance.   Respiratory:  Negative for cough and shortness of breath.    Cardiovascular:  Negative for chest pain and leg swelling.   Gastrointestinal:  Negative for abdominal pain, diarrhea, nausea and vomiting.   Endocrine: Negative.    Genitourinary: Negative.    Musculoskeletal:  Negative for arthralgias.   Allergic/Immunologic: Negative.    Neurological:  Negative for dizziness and light-headedness.   Hematological: Negative.    Psychiatric/Behavioral:  Negative for behavioral problems and dysphoric mood.       Past Medical History:     Past Medical History:  "  Diagnosis Date   • Coronary ostial stenosis    • History of hepatitis C 3/19/2013   • Hypertension       Past Surgical History:     Past Surgical History:   Procedure Laterality Date   • CHEST TUBE INSERTION     • HAND SURGERY        Family History:     Family History   Problem Relation Age of Onset   • Arthritis Mother    • Multiple sclerosis Mother    • Diabetes Father       Social History:     Social History     Socioeconomic History   • Marital status: Single     Spouse name: None   • Number of children: None   • Years of education: None   • Highest education level: None   Occupational History   • None   Tobacco Use   • Smoking status: Never   • Smokeless tobacco: Never   Substance and Sexual Activity   • Alcohol use: Yes   • Drug use: No   • Sexual activity: None   Other Topics Concern   • None   Social History Narrative   • None     Social Determinants of Health     Financial Resource Strain: Not on file   Food Insecurity: Not on file   Transportation Needs: Not on file   Physical Activity: Not on file   Stress: Not on file   Social Connections: Not on file   Intimate Partner Violence: Not on file   Housing Stability: Not on file      Current Medications:     Current Outpatient Medications   Medication Sig Dispense Refill   • metoprolol succinate (TOPROL-XL) 50 mg 24 hr tablet Take 1 tablet (50 mg total) by mouth daily 90 tablet 3   • olmesartan-hydrochlorothiazide (BENICAR HCT) 40-12.5 MG per tablet Take 1 tablet by mouth daily 90 tablet 3   • tadalafil (CIALIS) 20 MG tablet Take 1 tablet (20 mg total) by mouth daily as needed for erectile dysfunction 10 tablet 6     No current facility-administered medications for this visit.      Allergies:     Allergies   Allergen Reactions   • Atorvastatin Fatigue   • Claritin-D 24 Hour  [Loratadine-Pseudoephedrine Er]       Physical Exam:     /76 (BP Location: Left arm, Patient Position: Sitting, Cuff Size: Large)   Pulse 72   Temp 99.1 °F (37.3 °C)   Ht 5' 6\" " (1.676 m)   Wt 75.8 kg (167 lb)   SpO2 100%   BMI 26.95 kg/m²     Physical Exam  Vitals and nursing note reviewed.   Constitutional:       General: He is not in acute distress.     Appearance: He is well-developed.   HENT:      Head: Normocephalic and atraumatic.   Eyes:      Conjunctiva/sclera: Conjunctivae normal.   Cardiovascular:      Rate and Rhythm: Normal rate and regular rhythm.      Heart sounds: No murmur heard.  Pulmonary:      Effort: Pulmonary effort is normal. No respiratory distress.      Breath sounds: Normal breath sounds.   Abdominal:      Palpations: Abdomen is soft.      Tenderness: There is no abdominal tenderness.   Musculoskeletal:         General: No swelling.      Cervical back: Neck supple.   Skin:     General: Skin is warm and dry.      Capillary Refill: Capillary refill takes less than 2 seconds.   Neurological:      Mental Status: He is alert.   Psychiatric:         Mood and Affect: Mood normal.          SATHISH Flynn  [facility]

## 2024-11-08 ENCOUNTER — OFFICE VISIT (OUTPATIENT)
Dept: FAMILY MEDICINE CLINIC | Facility: CLINIC | Age: 64
End: 2024-11-08

## 2024-11-08 VITALS
DIASTOLIC BLOOD PRESSURE: 80 MMHG | TEMPERATURE: 97.8 F | HEART RATE: 74 BPM | BODY MASS INDEX: 27.38 KG/M2 | WEIGHT: 170.4 LBS | HEIGHT: 66 IN | SYSTOLIC BLOOD PRESSURE: 128 MMHG | OXYGEN SATURATION: 98 %

## 2024-11-08 DIAGNOSIS — I10 BENIGN ESSENTIAL HYPERTENSION: ICD-10-CM

## 2024-11-08 PROCEDURE — 99213 OFFICE O/P EST LOW 20 MIN: CPT | Performed by: NURSE PRACTITIONER

## 2024-11-08 RX ORDER — METOPROLOL SUCCINATE 50 MG/1
50 TABLET, EXTENDED RELEASE ORAL DAILY
Qty: 90 TABLET | Refills: 3 | Status: SHIPPED | OUTPATIENT
Start: 2024-11-08 | End: 2025-11-08

## 2024-11-08 RX ORDER — OLMESARTAN MEDOXOMIL AND HYDROCHLOROTHIAZIDE 40/12.5 40; 12.5 MG/1; MG/1
1 TABLET ORAL DAILY
Qty: 90 TABLET | Refills: 3 | Status: SHIPPED | OUTPATIENT
Start: 2024-11-08

## 2024-11-08 NOTE — ASSESSMENT & PLAN NOTE
Orders:    metoprolol succinate (TOPROL-XL) 50 mg 24 hr tablet; Take 1 tablet (50 mg total) by mouth daily    olmesartan-hydrochlorothiazide (BENICAR HCT) 40-12.5 MG per tablet; Take 1 tablet by mouth daily  Blood pressure well controlled refills provided and will recheck in 6 months declines labs at this point as he is self pay. Will be going on to medicare next year and plans for a more comprehensive physical

## 2024-11-08 NOTE — PROGRESS NOTES
"Ambulatory Visit  Name: Lita Valencia      : 1960      MRN: [id number]  Encounter Provider: SATHISH Flynn  Encounter Date: 2024   Encounter department: [facility]    Assessment & Plan  Benign essential hypertension    Orders:    metoprolol succinate (TOPROL-XL) 50 mg 24 hr tablet; Take 1 tablet (50 mg total) by mouth daily    olmesartan-hydrochlorothiazide (BENICAR HCT) 40-12.5 MG per tablet; Take 1 tablet by mouth daily  Blood pressure well controlled refills provided and will recheck in 6 months declines labs at this point as he is self pay. Will be going on to medicare next year and plans for a more comprehensive physical      History of Present Illness     Patient here today for his six month check up and BP check. 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       Review of Systems   Constitutional:  Negative for activity change, appetite change, chills, diaphoresis, fatigue, fever and unexpected weight change.   HENT:  Negative for congestion, ear pain, hearing loss, postnasal drip, sinus pressure, sinus pain, sneezing and sore throat.    Eyes:  Negative for pain, redness and visual disturbance.   Respiratory:  Negative for cough and shortness of breath.    Cardiovascular:  Negative for chest pain and leg swelling.   Gastrointestinal:  Negative for abdominal pain, diarrhea, nausea and vomiting.   Musculoskeletal:  Negative for arthralgias.   Neurological:  Negative for dizziness and light-headedness.   Psychiatric/Behavioral:  Negative for behavioral problems and dysphoric mood.            Objective     /80 (BP Location: Left arm, Patient Position: Sitting)   Pulse 74   Temp 97.8 °F (36.6 °C) (Temporal)   Ht 5' 6\" (1.676 m)   Wt 77.3 kg (170 lb 6.4 oz)   SpO2 98%   BMI 27.50 kg/m²     Physical Exam  Constitutional:       General: He is not in acute distress.     Appearance: He is well-developed.   HENT:    "   Head: Normocephalic and atraumatic.   Eyes:      Pupils: Pupils are equal, round, and reactive to light.   Neck:      Thyroid: No thyromegaly.   Cardiovascular:      Rate and Rhythm: Normal rate and regular rhythm.      Heart sounds: Normal heart sounds. No murmur heard.  Pulmonary:      Effort: Pulmonary effort is normal. No respiratory distress.      Breath sounds: Normal breath sounds. No wheezing.   Abdominal:      General: Bowel sounds are normal.      Palpations: Abdomen is soft.   Musculoskeletal:         General: Normal range of motion.      Cervical back: Normal range of motion.   Skin:     General: Skin is warm and dry.   Neurological:      Mental Status: He is alert and oriented to person, place, and time.

## 2025-05-19 ENCOUNTER — OFFICE VISIT (OUTPATIENT)
Dept: FAMILY MEDICINE CLINIC | Facility: CLINIC | Age: 65
End: 2025-05-19

## 2025-05-19 VITALS
HEIGHT: 66 IN | DIASTOLIC BLOOD PRESSURE: 76 MMHG | BODY MASS INDEX: 23.4 KG/M2 | WEIGHT: 145.6 LBS | SYSTOLIC BLOOD PRESSURE: 108 MMHG | HEART RATE: 78 BPM | OXYGEN SATURATION: 96 % | TEMPERATURE: 100.3 F

## 2025-05-19 DIAGNOSIS — I10 BENIGN ESSENTIAL HYPERTENSION: Primary | ICD-10-CM

## 2025-05-19 PROCEDURE — 99213 OFFICE O/P EST LOW 20 MIN: CPT | Performed by: NURSE PRACTITIONER

## 2025-05-19 RX ORDER — OLMESARTAN MEDOXOMIL AND HYDROCHLOROTHIAZIDE 40/12.5 40; 12.5 MG/1; MG/1
1 TABLET ORAL DAILY
Qty: 90 TABLET | Refills: 3 | Status: SHIPPED | OUTPATIENT
Start: 2025-05-19

## 2025-05-19 RX ORDER — METOPROLOL SUCCINATE 50 MG/1
50 TABLET, EXTENDED RELEASE ORAL DAILY
Qty: 90 TABLET | Refills: 3 | Status: SHIPPED | OUTPATIENT
Start: 2025-05-19 | End: 2026-05-19

## 2025-05-19 NOTE — PATIENT INSTRUCTIONS
"Patient Education     Routine physical for adults   The Basics   Written by the doctors and editors at [facility]   What is a physical? -- A physical is a routine visit, or \"check-up,\" with your doctor. You might also hear it called a \"wellness visit\" or \"preventive visit.\"  During each visit, the doctor will:   Ask about your physical and mental health   Ask about your habits, behaviors, and lifestyle   Do an exam   Give you vaccines if needed   Talk to you about any medicines you take   Give advice about your health   Answer your questions  Getting regular check-ups is an important part of taking care of your health. It can help your doctor find and treat any problems you have. But it's also important for preventing health problems.  A routine physical is different from a \"sick visit.\" A sick visit is when you see a doctor because of a health concern or problem. Since physicals are scheduled ahead of time, you can think about what you want to ask the doctor.  How often should I get a physical? -- It depends on your age and health. In general, for people age 21 years and older:   If you are younger than 50 years, you might be able to get a physical every 3 years.   If you are 50 years or older, your doctor might recommend a physical every year.  If you have an ongoing health condition, like diabetes or high blood pressure, your doctor will probably want to see you more often.  What happens during a physical? -- In general, each visit will include:   Physical exam - The doctor or nurse will check your height, weight, heart rate, and blood pressure. They will also look at your eyes and ears. They will ask about how you are feeling and whether you have any symptoms that bother you.   Medicines - It's a good idea to bring a list of all the medicines you take to each doctor visit. Your doctor will talk to you about your medicines and answer any questions. Tell them if you are having any side effects that bother you. You " "should also tell them if you are having trouble paying for any of your medicines.   Habits and behaviors - This includes:   Your diet   Your exercise habits   Whether you smoke, drink alcohol, or use drugs   Whether you are sexually active   Whether you feel safe at home  Your doctor will talk to you about things you can do to improve your health and lower your risk of health problems. They will also offer help and support. For example, if you want to quit smoking, they can give you advice and might prescribe medicines. If you want to improve your diet or get more physical activity, they can help you with this, too.   Lab tests, if needed - The tests you get will depend on your age and situation. For example, your doctor might want to check your:   Cholesterol   Blood sugar   Iron level   Vaccines - The recommended vaccines will depend on your age, health, and what vaccines you already had. Vaccines are very important because they can prevent certain serious or deadly infections.   Discussion of screening - \"Screening\" means checking for diseases or other health problems before they cause symptoms. Your doctor can recommend screening based on your age, risk, and preferences. This might include tests to check for:   Cancer, such as breast, prostate, cervical, ovarian, colorectal, prostate, lung, or skin cancer   Sexually transmitted infections, such as chlamydia and gonorrhea   Mental health conditions like depression and anxiety  Your doctor will talk to you about the different types of screening tests. They can help you decide which screenings to have. They can also explain what the results might mean.   Answering questions - The physical is a good time to ask the doctor or nurse questions about your health. If needed, they can refer you to other doctors or specialists, too.  Adults older than 65 years often need other care, too. As you get older, your doctor will talk to you about:   How to prevent falling at " home   Hearing or vision tests   Memory testing   How to take your medicines safely   Making sure that you have the help and support you need at home  All topics are updated as new evidence becomes available and our peer review process is complete.  This topic retrieved from [employer] on: May 02, 2024.  Topic 161807 Version 1.0  Release: 32.4.3 - C32.122  © 2024 UpToDate, Inc[employer] and/or its affiliates. All rights reserved.  Consumer Information Use and Disclaimer   Disclaimer: This generalized information is a limited summary of diagnosis, treatment, and/or medication information. It is not meant to be comprehensive and should be used as a tool to help the user understand and/or assess potential diagnostic and treatment options. It does NOT include all information about conditions, treatments, medications, side effects, or risks that may apply to a specific patient. It is not intended to be medical advice or a substitute for the medical advice, diagnosis, or treatment of a health care provider based on the health care provider's examination and assessment of a patient's specific and unique circumstances. Patients must speak with a health care provider for complete information about their health, medical questions, and treatment options, including any risks or benefits regarding use of medications. This information does not endorse any treatments or medications as safe, effective, or approved for treating a specific patient. UpToDate, Inc. and its affiliates disclaim any warranty or liability relating to this information or the use thereof.The use of this information is governed by the Terms of Use, available at https://www.woltersJoin[employer] Taxiuwer.com/en/know/clinical-effectiveness-terms. 2024© UpToDate, Inc. and its affiliates and/or licensors. All rights reserved.  Copyright   © 2024 UpToDate, Inc. and/or its affiliates. All rights reserved.

## 2025-05-19 NOTE — PROGRESS NOTES
Adult Annual Physical  Name: Lita Valencia      : 1960      MRN: [id number]  Encounter Provider: SATHISH Flynn  Encounter Date: 2025   Encounter department: [facility]    :  Assessment & Plan  Benign essential hypertension    Orders:    metoprolol succinate (TOPROL-XL) 50 mg 24 hr tablet; Take 1 tablet (50 mg total) by mouth daily    olmesartan-hydrochlorothiazide (BENICAR HCT) 40-12.5 MG per tablet; Take 1 tablet by mouth daily    Patient BP is well controlled and will continue with current medications and will schedule for his Medicare well        Immunizations:  - Immunizations due: Tdap, Zoster (Shingrix), Hepatitis A and Hepatitis B         History of Present Illness     Adult Annual Physical:  Patient presents for annual physical. Patient here today for his check up he has been trying to lose weight and is doing well He has no new troubles to report .     Diet and Physical Activity:  - Diet/Nutrition: no special diet.  - Exercise: no formal exercise.    Depression Screening:  - PHQ-2 Score: 0    General Health:  - Sleep: sleeps well and 7-8 hours of sleep on average.  - Hearing: normal hearing bilateral ears.  - Vision: no vision problems.  - Dental: regular dental visits, brushes teeth twice daily, floss regularly and no dental visits for > 1 year.    Review of Systems   Constitutional:  Negative for activity change, appetite change, chills, diaphoresis, fatigue, fever and unexpected weight change.   HENT:  Negative for congestion, ear pain, hearing loss, postnasal drip, sinus pressure, sinus pain, sneezing and sore throat.    Eyes:  Negative for pain, redness and visual disturbance.   Respiratory:  Negative for cough and shortness of breath.    Cardiovascular:  Negative for chest pain and leg swelling.   Gastrointestinal:  Negative for abdominal pain, diarrhea, nausea and vomiting.   Musculoskeletal:  Negative for arthralgias.   Neurological:  Negative for  "dizziness and light-headedness.   Psychiatric/Behavioral:  Negative for behavioral problems and dysphoric mood.          Objective   /76 (BP Location: Left arm, Patient Position: Sitting, Cuff Size: Adult)   Pulse 78   Temp 100.3 °F (37.9 °C)   Ht 5' 6\" (1.676 m)   Wt 66 kg (145 lb 9.6 oz)   SpO2 96%   BMI 23.50 kg/m²     Physical Exam  Constitutional:       General: He is not in acute distress.     Appearance: He is well-developed.   HENT:      Head: Normocephalic and atraumatic.     Eyes:      Pupils: Pupils are equal, round, and reactive to light.     Neck:      Thyroid: No thyromegaly.     Cardiovascular:      Rate and Rhythm: Normal rate and regular rhythm.      Heart sounds: Normal heart sounds. No murmur heard.  Pulmonary:      Effort: Pulmonary effort is normal. No respiratory distress.      Breath sounds: Normal breath sounds. No wheezing.   Abdominal:      General: Bowel sounds are normal.      Palpations: Abdomen is soft.     Musculoskeletal:         General: Normal range of motion.      Cervical back: Normal range of motion.     Skin:     General: Skin is warm and dry.     Neurological:      Mental Status: He is alert and oriented to person, place, and time.         "

## 2025-05-19 NOTE — ASSESSMENT & PLAN NOTE
Orders:    metoprolol succinate (TOPROL-XL) 50 mg 24 hr tablet; Take 1 tablet (50 mg total) by mouth daily    olmesartan-hydrochlorothiazide (BENICAR HCT) 40-12.5 MG per tablet; Take 1 tablet by mouth daily